# Patient Record
Sex: MALE | Race: WHITE | Employment: OTHER | URBAN - METROPOLITAN AREA
[De-identification: names, ages, dates, MRNs, and addresses within clinical notes are randomized per-mention and may not be internally consistent; named-entity substitution may affect disease eponyms.]

---

## 2017-10-09 ENCOUNTER — DOCUMENTATION ONLY (OUTPATIENT)
Dept: SLEEP MEDICINE | Facility: CLINIC | Age: 72
End: 2017-10-09

## 2017-10-09 RX ORDER — TAMSULOSIN HYDROCHLORIDE 0.4 MG/1
0.4 CAPSULE ORAL AT BEDTIME
COMMUNITY

## 2017-10-09 RX ORDER — COLCHICINE 0.6 MG/1
0.6 TABLET ORAL EVERY OTHER DAY
COMMUNITY
End: 2024-08-19

## 2017-10-09 RX ORDER — BUMETANIDE 1 MG/1
1 TABLET ORAL DAILY
COMMUNITY
End: 2024-08-19

## 2017-10-09 RX ORDER — CHLORAL HYDRATE 500 MG
1 CAPSULE ORAL DAILY
COMMUNITY
End: 2024-08-19

## 2017-10-09 RX ORDER — WARFARIN SODIUM 5 MG/1
5 TABLET ORAL DAILY
COMMUNITY
End: 2024-08-19

## 2017-10-09 RX ORDER — ALBUTEROL SULFATE 90 UG/1
2 AEROSOL, METERED RESPIRATORY (INHALATION) 4 TIMES DAILY PRN
COMMUNITY

## 2017-10-09 RX ORDER — ALLOPURINOL 100 MG/1
200 TABLET ORAL DAILY
COMMUNITY

## 2017-10-09 NOTE — PROGRESS NOTES
Updated medication and allergy list per [facility] chart notes.     OCTAVIO Constantino  Sleep Clinic-Specialist,    Registered Medical Assistant   [facility]

## 2017-10-10 ENCOUNTER — TELEPHONE (OUTPATIENT)
Dept: SLEEP MEDICINE | Facility: CLINIC | Age: 72
End: 2017-10-10

## 2017-10-10 NOTE — TELEPHONE ENCOUNTER
Scheduling letter sent/faxed to [facility] to confirm that pt has been scheduled for sleep consultation. [facility] for [facility] and plan of care.   Ashley Foss,

## 2017-10-10 NOTE — LETTER
October 10, 2017    Dear Beau Wen Khalil, Monse Rossi, and Mell Vallecillo:  Your patient: Aneudy Bergeron  : [birth year]  Last four SSN: [id number]  Is scheduled for a sleep consultation at [facility]   Sleep Consultation Date:  @ 9:30 AM  Please make sure that the patient brings a sleep aid if you think that they may need one for the study.  Final results will be faxed to: [phone]  Patient will also have a copy of the final results mailed to them.  Final results will not be discussed with patient if no follow up in our center is requested.  Patient is instructed to follow up with you for final result and further recommendations/orders, etc.  Please contact our  staff directly with questions/concerns.  Phone [phone], Fax [phone]  Thank you for the referral.  Sincerely,   Sherlyn Anaya MD  Medical Director [facility]  Geoff Salazar MD  Medical Director [facility]

## 2017-10-12 ENCOUNTER — OFFICE VISIT (OUTPATIENT)
Dept: SLEEP MEDICINE | Facility: CLINIC | Age: 72
End: 2017-10-12
Attending: INTERNAL MEDICINE
Payer: COMMERCIAL

## 2017-10-12 VITALS
HEART RATE: 60 BPM | BODY MASS INDEX: 37.51 KG/M2 | HEIGHT: 73 IN | RESPIRATION RATE: 18 BRPM | WEIGHT: 283 LBS | SYSTOLIC BLOOD PRESSURE: 116 MMHG | DIASTOLIC BLOOD PRESSURE: 68 MMHG | OXYGEN SATURATION: 95 %

## 2017-10-12 DIAGNOSIS — G47.33 OBSTRUCTIVE SLEEP APNEA: Primary | ICD-10-CM

## 2017-10-12 PROBLEM — I10 ESSENTIAL HYPERTENSION: Status: ACTIVE | Noted: 2017-10-12

## 2017-10-12 PROBLEM — G89.4 CHRONIC PAIN SYNDROME: Status: ACTIVE | Noted: 2017-10-12

## 2017-10-12 PROBLEM — I50.22 CHRONIC SYSTOLIC CONGESTIVE HEART FAILURE (H): Status: ACTIVE | Noted: 2017-10-12

## 2017-10-12 PROCEDURE — 99211 OFF/OP EST MAY X REQ PHY/QHP: CPT | Mod: ZF

## 2017-10-12 PROCEDURE — 99213 OFFICE O/P EST LOW 20 MIN: CPT

## 2017-10-12 RX ORDER — SPIRONOLACTONE 25 MG
12.5 TABLET ORAL DAILY
COMMUNITY
End: 2024-08-19

## 2017-10-12 NOTE — NURSING NOTE
"Chief Complaint   Patient presents with     Consult       Initial /68  Pulse 60  Resp 18  Ht 1.854 m (6' 1\")  Wt 128.4 kg (283 lb)  SpO2 95%  BMI 37.34 kg/m2 Estimated body mass index is 37.34 kg/(m^2) as calculated from the following:    Height as of this encounter: 1.854 m (6' 1\").    Weight as of this encounter: 128.4 kg (283 lb).  Medication Reconciliation: complete   Estella Manzanares CMA       "

## 2017-10-12 NOTE — PROGRESS NOTES
DATE OF SERVICE:  10/12/2017      Mr. Aneudy Bergeron is a 72-year-old male with significant medical comorbidities who was referred for evaluation of possible sleep-related breathing disorder in the setting of symptoms suggestive of obstructive sleep apnea as well as increased risk for central sleep apnea.   1.  Complex sleep apnea risk.  This gentleman has a long history of loud snoring throughout most of his marriage with no clear worsening of his condition, although he has been developing increasing daytime sleepiness with gradual weight gain to his current maximal BMI of 37.  He also has no known cardiomyopathy [ejection fraction originally 25-30% and more recently 40-45%] with ischemic etiology associated coronary artery disease and history of atrial fibrillation with a cardiac defibrillator in place.  In addition, he has chronic pain syndrome with regularly scheduled morphine for chronic neuropathic pain in doses that are associated with central sleep apnea.  Both his heart failure and morphine dosing increase the risk of nocturnal hypoventilation, hypoxemia and central sleep apnea.      It should be noted this gentleman's congestive heart failure is symptomatically improved with the use of diuretics.  Currently he has had less peripheral edema and exertional dyspnea, although he still experiences dyspnea climbing stairs and sleeps with his bed at a 30-degree angle because of some improvement in breathing.      PAST MEDICAL HISTORY:   1.  History of chronic neuropathy as well as symptoms of restless legs syndrome with sleep disruption, chronic pain and tingling and numbness in his lower legs managed with gabapentin 600 mg 3 times daily and 900 mg at night as well as morphine 15 mg every 8 hours.   2.  History of kidney stones.   3.  Obesity.   4.  History of chronic tobacco use.   5.  History of hypertension, currently on metoprolol and lisinopril.   6.  History of gout, currently on colchicine during symptomatic  periods.      SLEEP HABITS:  This patient has evidence for inappropriate napping during the day during quiet activities, although his [place] Sleepiness Scale is only 4 and he does not have drowsy driving.  He has a fairly regular bedtime at 10:00 p.m. and awakening at 5:00 a.m., though notes that 3 nights out of the week he often has prominent difficulty falling asleep with urge to move and rub his legs due to a painful sensation and numbness in his legs.  He often has to stand and stretch or walk to the bathroom at night.  He describes an abnormal sensation in his legs and twitching of his legs during the night.      This patient because of pain and difficulty falling asleep does not regularly use a sleep promoting agent but does have the television on throughout the night due to frustration with being awake from his primarily sensory symptoms in his legs.      PAST MEDICAL SURGERIES:   1.  History of cervical myelopathy with 3 separate fusions.   2.  Previous coronary angiography.   3.  Ocular injections for macular degeneration.   4.  History of dual-chamber pacemaker and defibrillator.      REVIEW OF SYSTEMS:  Ten-point review of systems is positive for multiple complaints including occasional chest pain, irregular heart rhythm, dyspnea when lying flat, chronic edema of his lower extremities, extensive pain, numb and tingling in his legs as noted above, chronic constipation, dyspnea with exertion and chronic joint pain.      PHYSICAL EXAMINATION:   GENERAL:  Demonstrates a chronically ill-appearing gentleman with normal respiratory pattern at rest, who has resting oxygen saturation of 95%, blood pressure 116/68, neck circumference 50 cm, Mallampati IV.     HEENT:  Oral exam with upper and lower denture plates.   LUNGS:  Clear lungs fields bilaterally.   HEART:  Irregular, no murmurs noted.  Distant heart tones.   EXTREMITIES:  Chronic stasis changes in both lower extremities with trace peripheral edema.    ABDOMEN:  Mild central obesity.      ASSESSMENT:   1.  High probability for complex sleep apnea with features of obstructive sleep apnea as well as central pauses due to either morphine or underlying cardiomyopathy with Cheyne-Cabral.  It is certainly possible in this setting that he has sustained or episodic hypoxemia.   2.  Chronic neuropathy with rather severe restless legs syndrome with associated pain syndrome with probable sleep disruption and irregular sleep pattern.  Although the patient is napping during the day he does not have symptoms of drowsy driving long distances.      PLAN:   Sleep studies for evaluation of nocturnal hypoxemia, obstructive sleep apnea and central sleep apnea.  In the event of obstructive sleep apnea, CPAP will be titrated to eliminate obstructive events with the addition of oxygen if he has persistent sustained or episodic hypoxemia with a total duration of greater than 5 minutes.  CO2 monitoring will be performed, although we are not requesting arterial blood gases because of his chronic anticoagulation.     2.  We will not be recommending the use of adaptive servo ventilation given his severely reduced ejection fraction.  The patient will be studied in the semi-reclining position, a 30-degree angle which is what he uses at home.  Monitoring will be performed for sleep disruption and restless legs syndrome.  The patient did refuse sleep promoting agent in this setting for fear that it may interfere with his other medications.      This patient would be interested in trying the use of CPAP for long-term management with additional oxygen if necessary, which may be preferable treatment for complex sleep apnea in this setting.  Adaptive servo ventilation carries an at least described increased risk of mortality in this setting and will not be employed.        3.  Ferritin should be measured if [facility] not [facility] to exclude low iron as a contributing factor  for his restless legs syndrome, although seems much more likely that his symptoms are related to underlying neuropathic process.  Increase gabapentin dosing at night might be considered, although the dosing to gabapentin will have to be carefully gauged given underlying renal insufficiency.      Total time spent with patient was 45 minutes, greater than 50% counseling.         FIDENCIO BRYAN MD             D: 10/12/2017 11:51   T: 10/12/2017 12:44   MT:       Name:     TOR HOGAN   MRN:      [id number]        Account:      [id number]   :      1945           Visit Date:   10/12/2017      Document: [id number]       cc: Charlene Mott MD

## 2017-10-12 NOTE — MR AVS SNAPSHOT
"              After Visit Summary   10/12/2017    Aneudy Bergeron    MRN: [id number]           Patient Information     Date Of Birth          1945        Visit Information        Provider Department      10/12/2017 10:00 AM Geoff Salazar MD [facility], [place], Sleep Study        Today's Diagnoses     Obstructive sleep apnea    -  1      Care Instructions    MY TREATMENT INFORMATION FOR SLEEP APNEA-  Aneudy Bergeron    DOCTOR : GEOFF SALAZAR  SLEEP CENTER :      MY CONTACT NUMBER:     Am I having a sleep study at a sleep center?  Make sure you have an appointment for the study before you leave!    Am I having a home sleep study?  Watch this video:  [email or web address]/watch?v=CteI_[email or web address]&[email or web address]    Frequently asked questions:  1. What is Obstructive Sleep Apnea (ARISTEO)? ARISTEO is the most common type of sleep apnea. Apnea means, \"without breath.\"  Apnea is most often caused by narrowing or collapse of the upper airway as muscles relax during sleep.   Almost everyone has occasional apneas. Most people with sleep apnea have had brief interruptions at night frequently for many years.  The severity of sleep apnea is related to how frequent and severe the events are.   2. What are the consequences of ARISTEO? Symptoms include: feeling sleepy during the day, snoring loudly, gasping or stopping of breathing, trouble sleeping, and occasionally morning headaches or heartburn at night.  Sleepiness can be serious and even increase the risk of falling asleep while driving. Other health consequences may include development of high blood pressure and other cardiovascular disease in persons who are susceptible. Untreated ARISTEO  can contribute to heart disease, stroke and diabetes.   3. What are the treatment options? In most situations, sleep apnea is a lifelong disease that must be managed with daily therapy. Medications are not effective for sleep apnea and surgery is generally not considered until other " therapies have been tried. Your treatment is your choice . Continuous Positive Airway (CPAP) works right away and is the therapy that is effective in nearly everyone. An oral device to hold your jaw forward is usually the next most reliable option. Other options include postioning devices (to keep you off your back), weight loss, and surgery including a tongue pacing device. There is more detail about some of these options below.    Important tips for using CPAP and similar devices   Know your equipment:  CPAP is continuous positive airway pressure that prevents obstructive sleep apnea by keeping the throat from collapsing while you are sleeping. In most cases, the device is  smart  and can slowly self-adjusts if your throat collapses and keeps a record every day of how well you are treated-this information is available to you and your care team.  BPAP is bilevel positive airway pressure that keeps your throat open and also assists each breath with a pressure boost to maintain adequate breathing.  Special kinds of BPAP are used in patients who have inadequate breathing from lung or heart disease. In most cases, the device is  smart  and can slowly self-adjusts to assist breathing. Like CPAP, the device keeps a record of how well you are treated.  Your mask is your connection to the device. You get to choose what feels most comfortable and the staff will help to make sure if fits. Here: are some examples of the different masks that are available:       Key points to remember on your journey with sleep apnea:  1. Sleep study.  PAP devices often need to be adjusted during a sleep study to show that they are effective and adjusted right.  2. Good tips to remember: Try wearing just the mask during a quiet time during the day so your body adapts to wearing it. A humidifier is recommended for comfort in most cases to prevent drying of your nose and throat. Allergy medication from your provider may help you if you are  having nasal congestion.  3. Getting settled-in. It takes more than one night for most of us to get used to wearing a mask. Try wearing just the mask during a quiet time during the day so your body adapts to wearing it. A humidifier is recommended for comfort in most cases. Our team will work with you carefully on the first day and will be in contact within 4 days and again at 2 and 4 weeks for advice and remote device adjustments. Your therapy is evaluated by the device each day.   4. Use it every night. The more you are able to sleep naturally for 7-8 hours, the more likely you will have good sleep and to prevent health risks or symptoms from sleep apnea. Even if you use it 4 hours it helps. Occasionally all of us are unable to use a medical therapy, in sleep apnea, it is not dangerous to miss one night.   5. Communicate. Call our skilled team on the number provided on the first day if your visit for problems that make it difficult to wear the device. Over 2 out of 3 patients can learn to wear the device long-term with help from our team. Remember to call our team or your sleep providers if you are unable to wear the device as we may have other solutions for those who cannot adapt to mask CPAP therapy. It is recommended that you sleep your sleep provider within the first 3 months and yearly after that if you are not having problems.   Take care of your equipment. Make sure you clean your mask and tubing using directions every day and that your filter and mask are replaced as recommended or if they are not working.     BESIDES CPAP, WHAT OTHER THERAPIES ARE THERE?    Positioning Device  Positioning devices are generally used when sleep apnea is mild and only occurs on your back.This example shows a pillow that straps around the waist. It may be appropriate for those whose sleep study shows milder sleep apnea that occurs primarily when lying flat on one's back. Preliminary studies have shown benefit but  effectiveness at home may need to be verified by a home sleep test. These devices are generally not covered by medical insurance.        Oral Appliance  What is oral appliance therapy?  An oral appliance device fits on your teeth at night like a retainer used after having braces. The device is made by a specialized dentist and requires several visits over 1-2 months before a manufactured device is made to fit your teeth and is adjusted to prevent your sleep apnea. Once an oral device is working properly, snoring should be improved. A home sleep test may be recommended at that time if to determine whether the sleep apnea is adequately treated.       Some things to remember:  -Oral devices are often, but not always, covered by your medical insurance. Be sure to check with your insurance provider.   -If you are referred for oral therapy, you will be given a list of specialized dentists to consider or you may choose to visit the Web site of the American Academy of Dental Sleep Medicine  -Oral devices are less likely to work if you have severe sleep apnea or are extremely overweight.     More detailed information  An oral appliance is a small acrylic device that fits over the upper and lower teeth  (similar to a retainer or a mouth guard). This device slightly moves jaw forward, which moves the base of the tongue forward, opens the airway, improves breathing for effective treat snoring and obstructive sleep apnea in perhaps 7 out of 10 people .  The best working devices are custom-made by a dental device  after a mold is made of the teeth 1, 2, 3.  When is an oral appliance indicated?  Oral appliance therapy is recommended as a first-line treatment for patients with primary snoring, mild sleep apnea, and for patients with moderate sleep apnea who prefer appliance therapy to use of CPAP4, 5. Severity of sleep apnea is determined by sleep testing and is based on the number of respiratory events per hour of  sleep.   How successful is oral appliance therapy?  The success rate of oral appliance therapy in patients with mild sleep apnea is 75-80% while in patients with moderate sleep apnea it is 50-70%. The chance of success in patients with severe sleep apnea is 40-50%. The research also shows that oral appliances have a beneficial effect on the cardiovascular health of ARISTEO patients at the same magnitude as CPAP therapy7.  Oral appliances should be a second-line treatment in cases of severe sleep apnea, but if not completely successful then a combination therapy utilizing CPAP plus oral appliance therapy may be effective. Oral appliances tend to be effective in a broad range of patients although studies show that the patients who have the highest success are females, younger patients, those with milder disease, and less severe obesity. 3, 6.   Finding a dentist that practices dental sleep medicine  Specific training is available through the American Academy of Dental Sleep Medicine for dentists interested in working in the field of sleep. To find a dentist who is educated in the field of sleep and the use of oral appliances, near you, visit the Web site of the American Academy of Dental Sleep Medicine.    References  1. Fausto et al. Objectively measured vs self-reported compliance during oral appliance therapy for sleep-disordered breathing. Chest 2013; 144(5): 2934-3723.  2. Carmen et al. Objective measurement of compliance during oral appliance therapy for sleep-disordered breathing. Thorax 2013; 68(1): 91-96.  3. Topher, et al. Mandibular advancement devices in 620 men and women with ARISTEO and snoring: tolerability and predictors of treatment success. Chest 2004; 125: 7893-1349.  4. Juan, et al. Oral appliances for snoring and ARISTEO: a review. Sleep 2006; 29: 244-262.  5. Naheed, et al. Oral appliance treatment for ARISTEO: an update. J Clin Sleep Med 2014; 10(2): 215-227.  6. Dandy et al. Predictors  of OSAH treatment outcome. J Dent Res 2007; 86: 0424-3344.      Weight Loss:    Weight loss is a long-term strategy that may improve sleep apnea in some patients.    Weight management is a personal decision.  If you are interested in exploring weight loss strategies, the following discussion covers the impact on weight loss on sleep apnea and the approaches that may be successful.    Weight loss decreases severity of sleep apnea in most people with obesity. For those with mild obesity who have developed snoring with weight gain, even 15-30 pound weight loss can improve and occasionally eliminate sleep apnea.  Structured and life-long dietary and health habits are necessary to lose weight and keep healthier weight levels.     Though there may be significant health benefits from weight loss, long-term weight loss is very difficult to achieve- studies show success with dietary management in less than 10% of people. In addition, substantial weight loss may require years of dietary control and may be difficult if patients have severe obesity. In these cases, surgical management may be considered.  Finally, older individuals who have tolerated obesity without health complications may be less likely to benefit from weight loss strategies.        Your BMI is Body mass index is 37.34 kg/(m^2).  Weight management is a personal decision.  If you are interested in exploring weight loss strategies, the following discussion covers the approaches that may be successful. Body mass index (BMI) is one way to tell whether you are at a healthy weight, overweight, or obese. It measures your weight in relation to your height.  A BMI of 18.5 to 24.9 is in the healthy range. A person with a BMI of 25 to 29.9 is considered overweight, and someone with a BMI of 30 or greater is considered obese. More than two-thirds of American adults are considered overweight or obese.  Being overweight or obese increases the risk for further weight gain.  Excess weight may lead to heart disease and diabetes.  Creating and following plans for healthy eating and physical activity may help you improve your health.  Weight control is part of healthy lifestyle and includes exercise, emotional health, and healthy eating habits. Careful eating habits lifelong are the mainstay of weight control. Though there are significant health benefits from weight loss, long-term weight loss with diet alone may be very difficult to achieve- studies show long-term success with dietary management in less than 10% of people. Attaining a healthy weight may be especially difficult to achieve in those with severe obesity. In some cases, medications, devices and surgical management might be considered.  What can you do?  If you are overweight or obese and are interested in methods for weight loss, you should discuss this with your provider.     Consider reducing daily calorie intake by 500 calories.     Keep a food journal.     Avoiding skipping meals, consider cutting portions instead.    Diet combined with exercise helps maintain muscle while optimizing fat loss. Strength training is particularly important for building and maintaining muscle mass. Exercise helps reduce stress, increase energy, and improves fitness. Increasing exercise without diet control, however, may not burn enough calories to loose weight.       Start walking three days a week 10-20 minutes at a time    Work towards walking thirty minutes five days a week     Eventually, increase the speed of your walking for 1-2 minutes at time    In addition, we recommend that you review healthy lifestyles and methods for weight loss available through the National Institutes of Health patient information sites:  http://win.niddk.nih.gov/publications/index.htm    And look into health and wellness programs that may be available through your health insurance provider, employer, local community center, or nazanin club.    Weight management  plan: Patient was referred to their PCP to discuss a diet and exercise plan.      Surgery:    Surgery for obstructive sleep apnea is considered generally only when other therapies fail to work. Surgery may be discussed with you if you are having a difficult time tolerating CPAP and or when there is an abnormal structure that requires surgical correction.  Nose and throat surgeries often enlarge the airway to prevent collapse.  Most of these surgeries create pain for 1-2 weeks and up to half of the most common surgeries are not effective throughout life.  You should carefully discuss the benefits and drawbacks to surgery with your sleep provider and surgeon to determine if it is the best solution for you.   More information  Surgery for ARISTEO is directed at areas that are responsible for narrowing or complete obstruction of the airway during sleep.  There are a wide range of procedures available to enlarge and/or stabilize the airway to prevent blockage of breathing in the three major areas where it can occur: the palate, tongue, and nasal regions.  Successful surgical treatment depends on the accurate identification of the factors responsible for obstructive sleep apnea in each person.  A personalized approach is required because there is no single treatment that works well for everyone.  Because of anatomic variation, consultation with an examination by a sleep surgeon is a critical first step in determining what surgical options are best for each patient.  In some cases, examination during sedation may be recommended in order to guide the selection of procedures.  Patients will be counseled about risks and benefits as well as the typical recovery course after surgery. Surgery is typically not a cure for a person s ARISTEO.  However, surgery will often significantly improve one s ARISTEO severity (termed  success rate ).  Even in the absence of a cure, surgery will decrease the cardiovascular risk associated with OSA7;  improve overall quality of life8 (sleepiness, functionality, sleep quality, etc).      Palate Procedures:  Patients with ARISTEO often have narrowing of their airway in the region of their tonsils and uvula.  The goals of palate procedures are to widen the airway in this region as well as to help the tissues resist collapse.  Modern palate procedure techniques focus on tissue conservation and soft tissue rearrangement, rather than tissue removal.  Often the uvula is preserved in this procedure. Residual sleep apnea is common in patient after pharyngoplasty with an average reduction in sleep apnea events of 33%2.      Tongue Procedures:  ExamWhile patients are awake, the muscles that surround the throat are active and keep this region open for breathing. These muscles relax during sleep, allowing the tongue and other structures to collapse and block breathing.  There are several different tongue procedures available.  Selection of a tongue base procedure depends on characteristics seen on physical exam.  Generally, procedures are aimed at removing bulky tissues in this area or preventing the back of the tongue from falling back during sleep.  Success rates for tongue surgery range from 50-62%3.    Hypoglossal Nerve Stimulation:  Hypoglossal nerve stimulation has recently received approval from the [place] Food and Drug Administration for the treatment of obstructive sleep apnea.  This is based on research showing that the system was safe and effective in treating sleep apnea6.  Results showed that the median AHI score decreased 68%, from 29.3 to 9.0. This therapy uses an implant system that senses breathing patterns and delivers mild stimulation to airway muscles, which keeps the airway open during sleep.  The system consists of three fully implanted components: a small generator (similar in size to a pacemaker), a breathing sensor, and a stimulation lead.  Using a small handheld remote, a patient turns the therapy  on before bed and off upon awakening.    Candidates for this device must be greater than 22 years of age, have moderate to severe ARISTEO (AHI between 20-65), BMI less than 32, have tried CPAP/oral appliance without success, and have appropriate upper airway anatomy (determined by a sleep endoscopy performed by Dr. Sifuentes).    Hypoglossal Nerve Stimulation Pathway:    The sleep surgeon s office will work with the patient through the insurance prior-authorization process (including communications and appeals).    Nasal Procedures:  Nasal obstruction can interfere with nasal breathing during the day and night.  Studies have shown that relief of nasal obstruction can improve the ability of some patients to tolerate positive airway pressure therapy for obstructive sleep apnea1.  Treatment options include medications such as nasal saline, topical corticosteroid and antihistamine sprays, and oral medications such as antihistamines or decongestants. Non-surgical treatments can include external nasal dilators for selected patients. If these are not successful by themselves, surgery can improve the nasal airway either alone or in combination with these other options.      Combination Procedures:  Combination of surgical procedures and other treatments may be recommended, particularly if patients have more than one area of narrowing or persistent positional disease.  The success rate of combination surgery ranges from 66-80%2,3.    References  1. Sae LONG. The Role of the Nose in Snoring and Obstructive Sleep Apnoea: An Update.  Eur Arch Otorhinolaryngol. 2011; 268: 1365-73.  2.  Manjit SM; Sorin JA; Tammi JR; Pallanch JF; Mónica MB; Chrissy SG; Lexi BANDA. Surgical modifications of the upper airway for obstructive sleep apnea in adults: a systematic review and meta-analysis. SLEEP 2010;33(10):4178-3970. Rodríguez JJ. Hypopharyngeal surgery in obstructive sleep apnea: an evidence-based medicine review.  Arch Otolaryngol Head Neck  Surg. 2006 Feb;132(2):206-13.  3. Sarkis YH1, Cristela Y, Paul ELIOT. The efficacy of anatomically based multilevel surgery for obstructive sleep apnea. Otolaryngol Head Neck Surg. 2003 Oct;129(4):327-35.  4. Kezirian E, Goldberg A. Hypopharyngeal Surgery in Obstructive Sleep Apnea: An Evidence-Based Medicine Review. Arch Otolaryngol Head Neck Surg. 2006 Feb;132(2):206-13.  5. Bambi RECINOS et al. Upper-Airway Stimulation for Obstructive Sleep Apnea.  N Engl J Med. 2014 Jan 9;370(2):139-49.  6. Taniya Y et al. Increased Incidence of Cardiovascular Disease in Middle-aged Men with Obstructive Sleep Apnea. Am J Respir Crit Care Med; 2002 166: 159-165  7. Michael HERNANDEZ et al. Studying Life Effects and Effectiveness of Palatopharyngoplasty (SLEEP) study: Subjective Outcomes of Isolated Uvulopalatopharyngoplasty. Otolaryngol Head Neck Surg. 2011; 144: 623-631.            Your BMI is Body mass index is 37.34 kg/(m^2).  Weight management is a personal decision.  If you are interested in exploring weight loss strategies, the following discussion covers the approaches that may be successful. Body mass index (BMI) is one way to tell whether you are at a healthy weight, overweight, or obese. It measures your weight in relation to your height.  A BMI of 18.5 to 24.9 is in the healthy range. A person with a BMI of 25 to 29.9 is considered overweight, and someone with a BMI of 30 or greater is considered obese. More than two-thirds of American adults are considered overweight or obese.  Being overweight or obese increases the risk for further weight gain. Excess weight may lead to heart disease and diabetes.  Creating and following plans for healthy eating and physical activity may help you improve your health.  Weight control is part of healthy lifestyle and includes exercise, emotional health, and healthy eating habits. Careful eating habits lifelong are the mainstay of weight control. Though there are significant health benefits from weight  loss, long-term weight loss with diet alone may be very difficult to achieve- studies show long-term success with dietary management in less than 10% of people. Attaining a healthy weight may be especially difficult to achieve in those with severe obesity. In some cases, medications, devices and surgical management might be considered.  What can you do?  If you are overweight or obese and are interested in methods for weight loss, you should discuss this with your provider.     Consider reducing daily calorie intake by 500 calories.     Keep a food journal.     Avoiding skipping meals, consider cutting portions instead.    Diet combined with exercise helps maintain muscle while optimizing fat loss. Strength training is particularly important for building and maintaining muscle mass. Exercise helps reduce stress, increase energy, and improves fitness. Increasing exercise without diet control, however, may not burn enough calories to loose weight.       Start walking three days a week 10-20 minutes at a time    Work towards walking thirty minutes five days a week     Eventually, increase the speed of your walking for 1-2 minutes at time    In addition, we recommend that you review healthy lifestyles and methods for weight loss available through the National Institutes of Health patient information sites:  http://win.niddk.nih.gov/publications/index.htm    And look into health and wellness programs that may be available through your health insurance provider, employer, local community center, or nazanin club.    Weight management plan: Patient was referred to their PCP to discuss a diet and exercise plan.              Follow-ups after your visit        Follow-up notes from your care team     Return after sleep study.      Your next 10 appointments already scheduled     Nov 15, 2017  8:00 PM CST   PSG Split with SLEEP STUDY RM 6   [facility], [place], Sleep Study ([facility])     "[address] [place] [id number]   [phone]              Future tests that were ordered for you today     Open Future Orders        Priority Expected Expires Ordered    Comprehensive Sleep Study Routine  4/10/2018 10/12/2017            Who to contact     If you have questions or need follow up information about today's clinic visit or your schedule please [facility] directly at [phone].  Normal or non-critical lab and imaging results will be communicated to you by [facility] [employer], letter or phone within 4 business days after the clinic has received the results. If you do not hear from us within 7 days, please contact the clinic through [employer] or phone. If you have a critical or abnormal lab result, we will notify you by phone as soon as possible.  Submit refill requests through [employer] or call your pharmacy and they will forward the refill request to us. Please allow 3 business days for your refill to be completed.          Additional Information About Your Visit        Cellity Information     Cellity lets you send messages to your doctor, view your test results, renew your prescriptions, schedule appointments and more. To sign up, go to [email or web address] . Click on \"Log in\" on the left side of the screen, which will take you to the Welcome page. Then click on \"Sign up Now\" on the right side of the page.     You will be asked to enter the access code listed below, as well as some personal information. Please follow the directions to create your username and password.     Your access code is: [email or web address]  Expires: 1/10/2018 11:09 AM     Your access code will  in 90 days. If you need help or a new code, please call your [place] clinic or [phone].        Care EveryWhere ID     This is your Care EveryWhere ID. This could be used by other organizations to access your [place] medical records  [id number]        Your Vitals Were     Pulse Respirations Height Pulse " "Oximetry BMI (Body Mass Index)       60 18 1.854 m (6' 1\") 95% 37.34 kg/m2        Blood Pressure from Last 3 Encounters:   10/12/17 116/68    Weight from Last 3 Encounters:   10/12/17 128.4 kg (283 lb)               Primary Care Provider    None Specified       No primary provider on file.        Equal Access to Services     [facility] : Hadii aad ku hadwagnero Soomaali, waaxda luqadaha, qaybta kaalmada adenachoyada, beverley zapata adrianan adenacho felixcecil bernalKaelaletician . So [facility] [phone].    ATENCIÓN: Si habla español, tiene a tran disposición servicios gratuitos de asistencia lingüística. Llame al [phone].    We comply with applicable federal civil rights laws and [place] laws. We do not discriminate on the basis of race, color, national origin, age, disability, sex, sexual orientation, or gender identity.            Thank you!     Thank you for choosing [facility], [facility]  for your care. Our goal is always to provide you with excellent care. Hearing back from our patients is one way we can continue to improve our services. Please take a few minutes to complete the written survey that you may receive in the mail after your visit with us. Thank you!             Your Updated Medication List - Protect others around you: Learn how to safely use, store and throw away your medicines at www.disposemymeds.org.          This list is accurate as of: 10/12/17 11:17 AM.  Always use your most recent med list.                   Brand Name Dispense Instructions for use Diagnosis    albuterol 108 (90 BASE) MCG/ACT Inhaler    PROAIR HFA/PROVENTIL HFA/VENTOLIN HFA     Inhale 2 puffs into the lungs 4 times daily        allopurinol 100 MG tablet    ZYLOPRIM     Take 200 mg by mouth daily        aspirin 81 MG tablet      Take 1 tablet by mouth daily        bumetanide 1 MG tablet    BUMEX     Take 1 mg by mouth daily        colchicine 0.6 MG tablet      Take 0.6 mg by mouth every other day        cyanocobalamin 1000 MCG Tabs      Take " 1 tablet by mouth daily        docusate sodium 50 MG capsule    COLACE     Take 50 mg by mouth 2 times daily        fish oil-omega-3 fatty acids 1000 MG capsule      Take 1 g by mouth daily        gabapentin 400 MG capsule    NEURONTIN     Take 300 mg by mouth 3 times daily Take 2 tablets every morning, at noon and every evening and take 3 tablets at bedtime.        lisinopril 20 MG tablet    PRINIVIL/ZESTRIL     Take 10 mg by mouth daily        metoprolol 25 MG 24 hr tablet    TOPROL-XL     Take 0.5 tablets by mouth At Bedtime        morphine 15 MG IR tablet    MSIR     Take 15 mg by mouth every 8 hours as needed        multivitamin  with lutein Caps per capsule      Take 1 capsule by mouth 2 times daily        naloxone nasal spray    NARCAN     Spray 4 mg into one nostril alternating nostrils as needed for opioid reversal every 2-3 minutes until assistance arrives        simvastatin 20 MG tablet    ZOCOR     Take 1 tablet by mouth At Bedtime        spironolactone 12.5 mg Tabs half-tab    ALDACTONE     Take 12.5 mg by mouth daily        tamsulosin 0.4 MG capsule    FLOMAX     Take 0.4 mg by mouth At Bedtime        warfarin 5 MG tablet    COUMADIN     Take 5 mg by mouth daily Take half tablet on Mondays.

## 2017-10-12 NOTE — PATIENT INSTRUCTIONS
"MY TREATMENT INFORMATION FOR SLEEP APNEA-  Aneudy Bergeron    DOCTOR : FIDENCIO BRYAN  SLEEP CENTER :      MY CONTACT NUMBER:     Am I having a sleep study at a sleep center?  Make sure you have an appointment for the study before you leave!    Am I having a home sleep study?  Watch this video:  [email or web address] [email or web address]/watch?v=CteI_[email or web address]&[email or web address]    Frequently asked questions:  1. What is Obstructive Sleep Apnea (ARISTEO)? ARISTEO is the most common type of sleep apnea. Apnea means, \"without breath.\"  Apnea is most often caused by narrowing or collapse of the upper airway as muscles relax during sleep.   Almost everyone has occasional apneas. Most people with sleep apnea have had brief interruptions at night frequently for many years.  The severity of sleep apnea is related to how frequent and severe the events are.   2. What are the consequences of ARISTEO? Symptoms include: feeling sleepy during the day, snoring loudly, gasping or stopping of breathing, trouble sleeping, and occasionally morning headaches or heartburn at night.  Sleepiness can be serious and even increase the risk of falling asleep while driving. Other health consequences may include development of high blood pressure and other cardiovascular disease in persons who are susceptible. Untreated ARISTEO  can contribute to heart disease, stroke and diabetes.   3. What are the treatment options? In most situations, sleep apnea is a lifelong disease that must be managed with daily therapy. Medications are not effective for sleep apnea and surgery is generally not considered until other therapies have been tried. Your treatment is your choice . Continuous Positive Airway (CPAP) works right away and is the therapy that is effective in nearly everyone. An oral device to hold your jaw forward is usually the next most reliable option. Other options include postioning devices (to keep you off your back), weight loss, and surgery including a tongue " pacing device. There is more detail about some of these options below.    Important tips for using CPAP and similar devices   Know your equipment:  CPAP is continuous positive airway pressure that prevents obstructive sleep apnea by keeping the throat from collapsing while you are sleeping. In most cases, the device is  smart  and can slowly self-adjusts if your throat collapses and keeps a record every day of how well you are treated-this information is available to you and your care team.  BPAP is bilevel positive airway pressure that keeps your throat open and also assists each breath with a pressure boost to maintain adequate breathing.  Special kinds of BPAP are used in patients who have inadequate breathing from lung or heart disease. In most cases, the device is  smart  and can slowly self-adjusts to assist breathing. Like CPAP, the device keeps a record of how well you are treated.  Your mask is your connection to the device. You get to choose what feels most comfortable and the staff will help to make sure if fits. Here: are some examples of the different masks that are available:       Key points to remember on your journey with sleep apnea:  1. Sleep study.  PAP devices often need to be adjusted during a sleep study to show that they are effective and adjusted right.  2. Good tips to remember: Try wearing just the mask during a quiet time during the day so your body adapts to wearing it. A humidifier is recommended for comfort in most cases to prevent drying of your nose and throat. Allergy medication from your provider may help you if you are having nasal congestion.  3. Getting settled-in. It takes more than one night for most of us to get used to wearing a mask. Try wearing just the mask during a quiet time during the day so your body adapts to wearing it. A humidifier is recommended for comfort in most cases. Our team will work with you carefully on the first day and will be in contact within 4 days  and again at 2 and 4 weeks for advice and remote device adjustments. Your therapy is evaluated by the device each day.   4. Use it every night. The more you are able to sleep naturally for 7-8 hours, the more likely you will have good sleep and to prevent health risks or symptoms from sleep apnea. Even if you use it 4 hours it helps. Occasionally all of us are unable to use a medical therapy, in sleep apnea, it is not dangerous to miss one night.   5. Communicate. Call our skilled team on the number provided on the first day if your visit for problems that make it difficult to wear the device. Over 2 out of 3 patients can learn to wear the device long-term with help from our team. Remember to call our team or your sleep providers if you are unable to wear the device as we may have other solutions for those who cannot adapt to mask CPAP therapy. It is recommended that you sleep your sleep provider within the first 3 months and yearly after that if you are not having problems.   Take care of your equipment. Make sure you clean your mask and tubing using directions every day and that your filter and mask are replaced as recommended or if they are not working.     BESIDES CPAP, WHAT OTHER THERAPIES ARE THERE?    Positioning Device  Positioning devices are generally used when sleep apnea is mild and only occurs on your back.This example shows a pillow that straps around the waist. It may be appropriate for those whose sleep study shows milder sleep apnea that occurs primarily when lying flat on one's back. Preliminary studies have shown benefit but effectiveness at home may need to be verified by a home sleep test. These devices are generally not covered by medical insurance.        Oral Appliance  What is oral appliance therapy?  An oral appliance device fits on your teeth at night like a retainer used after having braces. The device is made by a specialized dentist and requires several visits over 1-2 months before a  manufactured device is made to fit your teeth and is adjusted to prevent your sleep apnea. Once an oral device is working properly, snoring should be improved. A home sleep test may be recommended at that time if to determine whether the sleep apnea is adequately treated.       Some things to remember:  -Oral devices are often, but not always, covered by your medical insurance. Be sure to check with your insurance provider.   -If you are referred for oral therapy, you will be given a list of specialized dentists to consider or you may choose to visit the Web site of the American Academy of Dental Sleep Medicine  -Oral devices are less likely to work if you have severe sleep apnea or are extremely overweight.     More detailed information  An oral appliance is a small acrylic device that fits over the upper and lower teeth  (similar to a retainer or a mouth guard). This device slightly moves jaw forward, which moves the base of the tongue forward, opens the airway, improves breathing for effective treat snoring and obstructive sleep apnea in perhaps 7 out of 10 people .  The best working devices are custom-made by a dental device  after a mold is made of the teeth 1, 2, 3.  When is an oral appliance indicated?  Oral appliance therapy is recommended as a first-line treatment for patients with primary snoring, mild sleep apnea, and for patients with moderate sleep apnea who prefer appliance therapy to use of CPAP4, 5. Severity of sleep apnea is determined by sleep testing and is based on the number of respiratory events per hour of sleep.   How successful is oral appliance therapy?  The success rate of oral appliance therapy in patients with mild sleep apnea is 75-80% while in patients with moderate sleep apnea it is 50-70%. The chance of success in patients with severe sleep apnea is 40-50%. The research also shows that oral appliances have a beneficial effect on the cardiovascular health of ARISTEO patients  at the same magnitude as CPAP therapy7.  Oral appliances should be a second-line treatment in cases of severe sleep apnea, but if not completely successful then a combination therapy utilizing CPAP plus oral appliance therapy may be effective. Oral appliances tend to be effective in a broad range of patients although studies show that the patients who have the highest success are females, younger patients, those with milder disease, and less severe obesity. 3, 6.   Finding a dentist that practices dental sleep medicine  Specific training is available through the American Academy of Dental Sleep Medicine for dentists interested in working in the field of sleep. To find a dentist who is educated in the field of sleep and the use of oral appliances, near you, visit the Web site of the American Academy of Dental Sleep Medicine.    References  1. Fausto et al. Objectively measured vs self-reported compliance during oral appliance therapy for sleep-disordered breathing. Chest 2013; 144(5): 1749-4259.  2. Carmen et al. Objective measurement of compliance during oral appliance therapy for sleep-disordered breathing. Thorax 2013; 68(1): 91-96.  3. Topher, et al. Mandibular advancement devices in 620 men and women with ARISTEO and snoring: tolerability and predictors of treatment success. Chest 2004; 125: 9769-7650.  4. Martinez, et al. Oral appliances for snoring and ARISTEO: a review. Sleep 2006; 29: 244-262.  5. Naheed et al. Oral appliance treatment for ARISTEO: an update. J Clin Sleep Med 2014; 10(2): 215-227.  6. Dandy et al. Predictors of OSAH treatment outcome. J Dent Res 2007; 86: 6919-2528.      Weight Loss:    Weight loss is a long-term strategy that may improve sleep apnea in some patients.    Weight management is a personal decision.  If you are interested in exploring weight loss strategies, the following discussion covers the impact on weight loss on sleep apnea and the approaches that may be  successful.    Weight loss decreases severity of sleep apnea in most people with obesity. For those with mild obesity who have developed snoring with weight gain, even 15-30 pound weight loss can improve and occasionally eliminate sleep apnea.  Structured and life-long dietary and health habits are necessary to lose weight and keep healthier weight levels.     Though there may be significant health benefits from weight loss, long-term weight loss is very difficult to achieve- studies show success with dietary management in less than 10% of people. In addition, substantial weight loss may require years of dietary control and may be difficult if patients have severe obesity. In these cases, surgical management may be considered.  Finally, older individuals who have tolerated obesity without health complications may be less likely to benefit from weight loss strategies.        Your BMI is Body mass index is 37.34 kg/(m^2).  Weight management is a personal decision.  If you are interested in exploring weight loss strategies, the following discussion covers the approaches that may be successful. Body mass index (BMI) is one way to tell whether you are at a healthy weight, overweight, or obese. It measures your weight in relation to your height.  A BMI of 18.5 to 24.9 is in the healthy range. A person with a BMI of 25 to 29.9 is considered overweight, and someone with a BMI of 30 or greater is considered obese. More than two-thirds of American adults are considered overweight or obese.  Being overweight or obese increases the risk for further weight gain. Excess weight may lead to heart disease and diabetes.  Creating and following plans for healthy eating and physical activity may help you improve your health.  Weight control is part of healthy lifestyle and includes exercise, emotional health, and healthy eating habits. Careful eating habits lifelong are the mainstay of weight control. Though there are significant  health benefits from weight loss, long-term weight loss with diet alone may be very difficult to achieve- studies show long-term success with dietary management in less than 10% of people. Attaining a healthy weight may be especially difficult to achieve in those with severe obesity. In some cases, medications, devices and surgical management might be considered.  What can you do?  If you are overweight or obese and are interested in methods for weight loss, you should discuss this with your provider.     Consider reducing daily calorie intake by 500 calories.     Keep a food journal.     Avoiding skipping meals, consider cutting portions instead.    Diet combined with exercise helps maintain muscle while optimizing fat loss. Strength training is particularly important for building and maintaining muscle mass. Exercise helps reduce stress, increase energy, and improves fitness. Increasing exercise without diet control, however, may not burn enough calories to loose weight.       Start walking three days a week 10-20 minutes at a time    Work towards walking thirty minutes five days a week     Eventually, increase the speed of your walking for 1-2 minutes at time    In addition, we recommend that you review healthy lifestyles and methods for weight loss available through the National Institutes of Health patient information sites:  http://win.niddk.nih.gov/publications/index.htm    And look into health and wellness programs that may be available through your health insurance provider, employer, local community center, or nazanin club.    Weight management plan: Patient was referred to their PCP to discuss a diet and exercise plan.      Surgery:    Surgery for obstructive sleep apnea is considered generally only when other therapies fail to work. Surgery may be discussed with you if you are having a difficult time tolerating CPAP and or when there is an abnormal structure that requires surgical correction.  Nose and  throat surgeries often enlarge the airway to prevent collapse.  Most of these surgeries create pain for 1-2 weeks and up to half of the most common surgeries are not effective throughout life.  You should carefully discuss the benefits and drawbacks to surgery with your sleep provider and surgeon to determine if it is the best solution for you.   More information  Surgery for ARISTEO is directed at areas that are responsible for narrowing or complete obstruction of the airway during sleep.  There are a wide range of procedures available to enlarge and/or stabilize the airway to prevent blockage of breathing in the three major areas where it can occur: the palate, tongue, and nasal regions.  Successful surgical treatment depends on the accurate identification of the factors responsible for obstructive sleep apnea in each person.  A personalized approach is required because there is no single treatment that works well for everyone.  Because of anatomic variation, consultation with an examination by a sleep surgeon is a critical first step in determining what surgical options are best for each patient.  In some cases, examination during sedation may be recommended in order to guide the selection of procedures.  Patients will be counseled about risks and benefits as well as the typical recovery course after surgery. Surgery is typically not a cure for a person s ARISTEO.  However, surgery will often significantly improve one s ARISTEO severity (termed  success rate ).  Even in the absence of a cure, surgery will decrease the cardiovascular risk associated with OSA7; improve overall quality of life8 (sleepiness, functionality, sleep quality, etc).      Palate Procedures:  Patients with ARISTEO often have narrowing of their airway in the region of their tonsils and uvula.  The goals of palate procedures are to widen the airway in this region as well as to help the tissues resist collapse.  Modern palate procedure techniques focus on  tissue conservation and soft tissue rearrangement, rather than tissue removal.  Often the uvula is preserved in this procedure. Residual sleep apnea is common in patient after pharyngoplasty with an average reduction in sleep apnea events of 33%2.      Tongue Procedures:  ExamWhile patients are awake, the muscles that surround the throat are active and keep this region open for breathing. These muscles relax during sleep, allowing the tongue and other structures to collapse and block breathing.  There are several different tongue procedures available.  Selection of a tongue base procedure depends on characteristics seen on physical exam.  Generally, procedures are aimed at removing bulky tissues in this area or preventing the back of the tongue from falling back during sleep.  Success rates for tongue surgery range from 50-62%3.    Hypoglossal Nerve Stimulation:  Hypoglossal nerve stimulation has recently received approval from the [place] Food and Drug Administration for the treatment of obstructive sleep apnea.  This is based on research showing that the system was safe and effective in treating sleep apnea6.  Results showed that the median AHI score decreased 68%, from 29.3 to 9.0. This therapy uses an implant system that senses breathing patterns and delivers mild stimulation to airway muscles, which keeps the airway open during sleep.  The system consists of three fully implanted components: a small generator (similar in size to a pacemaker), a breathing sensor, and a stimulation lead.  Using a small handheld remote, a patient turns the therapy on before bed and off upon awakening.    Candidates for this device must be greater than 22 years of age, have moderate to severe ARISTEO (AHI between 20-65), BMI less than 32, have tried CPAP/oral appliance without success, and have appropriate upper airway anatomy (determined by a sleep endoscopy performed by Dr. Sifuentes).    Hypoglossal Nerve Stimulation  Pathway:    The sleep surgeon s office will work with the patient through the insurance prior-authorization process (including communications and appeals).    Nasal Procedures:  Nasal obstruction can interfere with nasal breathing during the day and night.  Studies have shown that relief of nasal obstruction can improve the ability of some patients to tolerate positive airway pressure therapy for obstructive sleep apnea1.  Treatment options include medications such as nasal saline, topical corticosteroid and antihistamine sprays, and oral medications such as antihistamines or decongestants. Non-surgical treatments can include external nasal dilators for selected patients. If these are not successful by themselves, surgery can improve the nasal airway either alone or in combination with these other options.      Combination Procedures:  Combination of surgical procedures and other treatments may be recommended, particularly if patients have more than one area of narrowing or persistent positional disease.  The success rate of combination surgery ranges from 66-80%2,3.    References  1. Sae LONG. The Role of the Nose in Snoring and Obstructive Sleep Apnoea: An Update.  Eur Arch Otorhinolaryngol. 2011; 268: 1365-73.  2.  Manjit SM; Sorin JA; Tammi JR; Pallanch JF; Mónica MB; Chrissy SG; Lexi BANDA. Surgical modifications of the upper airway for obstructive sleep apnea in adults: a systematic review and meta-analysis. SLEEP 2010;33(10):6516-2811. Rodríguez JJ. Hypopharyngeal surgery in obstructive sleep apnea: an evidence-based medicine review.  Arch Otolaryngol Head Neck Surg. 2006 Feb;132(2):206-13.  3. Sarkis YH1, Cristela Y, Paul ELIOT. The efficacy of anatomically based multilevel surgery for obstructive sleep apnea. Otolaryngol Head Neck Surg. 2003 Oct;129(4):327-35.  4. Rodríguez JJ, Goldberg A. Hypopharyngeal Surgery in Obstructive Sleep Apnea: An Evidence-Based Medicine Review. Arch Otolaryngol Head Neck Surg. 2006  Feb;132(2):206-13.  5. Bambi RECINOS et al. Upper-Airway Stimulation for Obstructive Sleep Apnea.  N Engl J Med. 2014 Jan 9;370(2):139-49.  6. Taniya Y et al. Increased Incidence of Cardiovascular Disease in Middle-aged Men with Obstructive Sleep Apnea. Am J Respir Crit Care Med; 2002 166: 159-165  7. Michael EM et al. Studying Life Effects and Effectiveness of Palatopharyngoplasty (SLEEP) study: Subjective Outcomes of Isolated Uvulopalatopharyngoplasty. Otolaryngol Head Neck Surg. 2011; 144: 623-631.            Your BMI is Body mass index is 37.34 kg/(m^2).  Weight management is a personal decision.  If you are interested in exploring weight loss strategies, the following discussion covers the approaches that may be successful. Body mass index (BMI) is one way to tell whether you are at a healthy weight, overweight, or obese. It measures your weight in relation to your height.  A BMI of 18.5 to 24.9 is in the healthy range. A person with a BMI of 25 to 29.9 is considered overweight, and someone with a BMI of 30 or greater is considered obese. More than two-thirds of American adults are considered overweight or obese.  Being overweight or obese increases the risk for further weight gain. Excess weight may lead to heart disease and diabetes.  Creating and following plans for healthy eating and physical activity may help you improve your health.  Weight control is part of healthy lifestyle and includes exercise, emotional health, and healthy eating habits. Careful eating habits lifelong are the mainstay of weight control. Though there are significant health benefits from weight loss, long-term weight loss with diet alone may be very difficult to achieve- studies show long-term success with dietary management in less than 10% of people. Attaining a healthy weight may be especially difficult to achieve in those with severe obesity. In some cases, medications, devices and surgical management might be considered.  What can you  do?  If you are overweight or obese and are interested in methods for weight loss, you should discuss this with your provider.     Consider reducing daily calorie intake by 500 calories.     Keep a food journal.     Avoiding skipping meals, consider cutting portions instead.    Diet combined with exercise helps maintain muscle while optimizing fat loss. Strength training is particularly important for building and maintaining muscle mass. Exercise helps reduce stress, increase energy, and improves fitness. Increasing exercise without diet control, however, may not burn enough calories to loose weight.       Start walking three days a week 10-20 minutes at a time    Work towards walking thirty minutes five days a week     Eventually, increase the speed of your walking for 1-2 minutes at time    In addition, we recommend that you review healthy lifestyles and methods for weight loss available through the National Institutes of Health patient information sites:  http://win.niddk.nih.gov/publications/index.htm    And look into health and wellness programs that may be available through your health insurance provider, employer, local community center, or nazanin club.    Weight management plan: Patient was referred to their PCP to discuss a diet and exercise plan.

## 2017-10-12 NOTE — Clinical Note
Copy of Dictation to: Ashley Mott NP Address [id number] [facility]  [place] [address]   Sudha Rodríguez [facility]

## 2017-11-15 ENCOUNTER — THERAPY VISIT (OUTPATIENT)
Dept: SLEEP MEDICINE | Facility: CLINIC | Age: 72
End: 2017-11-15
Attending: INTERNAL MEDICINE
Payer: COMMERCIAL

## 2017-11-15 DIAGNOSIS — G47.33 OBSTRUCTIVE SLEEP APNEA: ICD-10-CM

## 2017-11-15 PROCEDURE — 95810 POLYSOM 6/> YRS 4/> PARAM: CPT | Mod: ZF

## 2017-11-15 NOTE — MR AVS SNAPSHOT
After Visit Summary   11/15/2017    Aneduy Bergeron    MRN: [id number]           Patient Information     Date Of Birth          1945        Visit Information        Provider Department      11/15/2017 8:00 PM [facility]EP STUDY [facility]        Today's Diagnoses     Obstructive sleep apnea          Care Instructions    [place] [facility]    1. Your sleep study will be reviewed by a sleep physician within the next few days.     2. Please follow up in the sleep clinic as scheduled, or, make an appointment with your sleep provider to be seen within two weeks to discuss the results of the sleep study.    3. If you have any questions or problems with your treatment plan, please contact your sleep clinic provider at [phone] to further manage your condition.    4. Please review your attached medication list, and, at your follow-up appointment advise your sleep clinic provider about any changes.    5. Go to [email or web address] for more information about your sleep problems.    Jenny Ayala [facility]  November 15, 2017                Follow-ups after your visit        Your next 10 appointments already scheduled     Nov 30, 2017  3:00 PM CST   Return Sleep Patient with Geoff Salazar MD   [facility] [place], Sleep Study ([facility])    [address] [place] [id number] [phone]              Who to contact     If you have questions or need follow up information about today's clinic visit or your schedule please contact [facility] directly at [phone].  Normal or non-critical lab and imaging results will be communicated to you by MyChart, letter or phone within 4 business days after the clinic has received the results. If you do not hear from us within 7 days, please contact the clinic through MyChart or phone. If you have a critical or abnormal lab result, we will notify you by phone as  "soon as possible.  Submit refill requests through "[employer]" or call your pharmacy and they will forward the refill request to us. Please allow 3 business days for your refill to be completed.          Additional Information About Your Visit        [employer] Are [employer] Information     "[employer] lets you send messages to your doctor, view your test results, renew your prescriptions, schedule appointments and more. To sign up, go to [email or web address]/"[employer]" . Click on \"Log in\" on the left side of the screen, which will take you to the Welcome page. Then click on \"Sign up Now\" on the right side of the page.     You will be asked to enter the access code listed below, as well as some personal information. Please follow the directions to create your username and password.     Your access code is: [email or web address]  Expires: 1/10/2018 10:09 AM     Your access code will  in 90 days. If you need help or a new code, please call your [place] clinic or [phone].        Care EveryWhere ID     This is your Care EveryWhere ID. This could be used by other organizations to access your [place] medical records  [id number]         Blood Pressure from Last 3 Encounters:   10/12/17 116/68    Weight from Last 3 Encounters:   10/12/17 128.4 kg (283 lb)              We Performed the Following     Comprehensive Sleep Study        Primary Care Provider Office Phone # Fax #    Charlene Mott [phone]       [facility] PEDIATRICS [address] [address]        Equal Access to Services     [facility]: Hadii aad ku hadasho Soomaali, waaxda luqadaha, qaybta kaalmada adeegyada, waxay jodi lopez . So [facility] [phone].    ATENCIÓN: Si habla español, tiene a tran disposición servicios gratuitos de asistencia lingüística. Llame al [phone].    We comply with applicable federal civil rights laws and [place] laws. We do not discriminate on the basis of race, color, national origin, " age, disability, sex, sexual orientation, or gender identity.            Thank you!     Thank you for choosing [facility] [place], SLEEP STUDY  for your care. Our goal is always to provide you with excellent care. Hearing back from our patients is one way we can continue to improve our services. Please take a few minutes to complete the written survey that you may receive in the mail after your visit with us. Thank you!             Your Updated Medication List - Protect others around you: Learn how to safely use, store and throw away your medicines at www.disposemymeds.org.          This list is accurate as of: 11/15/17  8:43 PM.  Always use your most recent med list.                   Brand Name Dispense Instructions for use Diagnosis    albuterol 108 (90 BASE) MCG/ACT Inhaler    PROAIR HFA/PROVENTIL HFA/VENTOLIN HFA     Inhale 2 puffs into the lungs 4 times daily        allopurinol 100 MG tablet    ZYLOPRIM     Take 200 mg by mouth daily        aspirin 81 MG tablet      Take 1 tablet by mouth daily        bumetanide 1 MG tablet    BUMEX     Take 1 mg by mouth daily        colchicine 0.6 MG tablet      Take 0.6 mg by mouth every other day        cyanocobalamin 1000 MCG Tabs      Take 1 tablet by mouth daily        docusate sodium 50 MG capsule    COLACE     Take 50 mg by mouth 2 times daily        fish oil-omega-3 fatty acids 1000 MG capsule      Take 1 g by mouth daily        gabapentin 400 MG capsule    NEURONTIN     Take 300 mg by mouth 3 times daily Take 2 tablets every morning, at noon and every evening and take 3 tablets at bedtime.        lisinopril 20 MG tablet    PRINIVIL/ZESTRIL     Take 10 mg by mouth daily        metoprolol 25 MG 24 hr tablet    TOPROL-XL     Take 0.5 tablets by mouth At Bedtime        morphine 15 MG IR tablet    MSIR     Take 15 mg by mouth every 8 hours as needed        multivitamin  with lutein Caps per capsule      Take 1 capsule by mouth 2 times daily        naloxone nasal spray     NARCAN     Spray 4 mg into one nostril alternating nostrils as needed for opioid reversal every 2-3 minutes until assistance arrives        simvastatin 20 MG tablet    ZOCOR     Take 1 tablet by mouth At Bedtime        spironolactone 12.5 mg Tabs half-tab    ALDACTONE     Take 12.5 mg by mouth daily        tamsulosin 0.4 MG capsule    FLOMAX     Take 0.4 mg by mouth At Bedtime        warfarin 5 MG tablet    COUMADIN     Take 5 mg by mouth daily Take half tablet on Mondays.

## 2017-11-16 NOTE — PATIENT INSTRUCTIONS
[place] SLEEP [facility]    1. Your sleep study will be reviewed by a sleep physician within the next few days.     2. Please follow up in the sleep clinic as scheduled, or, make an appointment with your sleep provider to be seen within two weeks to discuss the results of the sleep study.    3. If you have any questions or problems with your treatment plan, please contact your sleep clinic provider at [phone] to further manage your condition.    4. Please review your attached medication list, and, at your follow-up appointment advise your sleep clinic provider about any changes.    5. Go to [email or web address] for more information about your sleep problems.    Jenny Ayala, RPSGT  November 15, 2017

## 2017-11-17 NOTE — PROGRESS NOTES
" SLEEP STUDY INTERPRETATION  POLYSOMNOGRAPHY REPORT      Patient: Aneudy Bergeron  YOB: 1945  Study Date: 11/15/2017  MRN: [id number]  Referring Provider: [facility]  Ordering Provider: Geoff Salazar MD    Indications for Polysomnography: The patient is a 72 y old Male who is 6' 1\" and weighs 283.0 lbs.  His BMI is 37.5, [place] sleepiness scale 4.0 and neck size is 46.0.  Relevant medical history includes cardiomyopathy with reduced EF (25%) and chronic pain with opiate use. A diagnostic polysomnogram was performed to evaluate for sleep apnea.    Polysomnogram Data:  A full night polysomnogram recorded the standard physiologic parameters including EEG, EOG, EMG, ECG, nasal and oral airflow.  Respiratory parameters of chest and abdominal movements were recorded with respiratory inductance plethysmography.  Oxygen saturation was recorded by pulse oximetry.      Sleep Architecture: Decreased sleep efficiency with prolonged sleep onset latency. Stage N3 sleep not seen.   The total recording time of the polysomnogram was 422.0 minutes.  The total sleep time was 281.0 minutes.  Sleep latency was increased at 40.4 minutes without the use of a sleep aid.  REM latency was 120.0 minutes.  Arousal index was normal at 9.6 arousals per hour.  Sleep efficiency was decreased at 66.6%.  Wake after sleep onset was 28.5 minutes.  The patient spent 8.0% of total sleep time in Stage N1, 76.9% in Stage N2, 0.0% in Stages N3, and 15.1% in REM.  Time in REM supine was 13.5 minutes.    Respiration: Mild obstructive sleep apnea with significant worsening in REM sleep, particularly in supine position. Central AHI <15%. No hypoventilation but hypoxemia noted.     Events - The polysomnogram revealed a presence of 14 obstructive, 0 central, and 0 mixed apneas resulting in an apnea index of 3.0 events per hour.  There were 29 hypopneas resulting in a hypopnea index of 6.2 events per hour.  The combined apnea/hypopnea index was " 9.2 events per hour.  The REM AHI was 39.5 events per hour.  The supine AHI was 11.2 events per hour.  The RERA index was 1.3 events per hour.   The RDI was 10.5 events per hour.    Snoring - was reported as moderately loud and intermittent, mostly during supine position.    Respiratory rate and pattern - was notable for normal respiratory rate and pattern.    Sustained Sleep Associated Hypoventilation - Transcutaneous carbon dioxide monitoring was used, however significant hypoventilation was not present with a range of 43-52 mmHg.    Sleep Associated Hypoxemia - (Greater than 5 minutes O2 sat below 89%) was present.  Baseline oxygen saturation was 94.7%. Lowest oxygen saturation was 67.1%.  Time spent less than or equal to 88% was 11.9 minutes.  Time spent less than or equal to 89% was 12.8 minutes.  10.5 1.3 9.2     Movement Activity: No movement abnormalities noted.     Periodic Limb Activity - There were 4 PLMs during the entire study. The PLM index was 0.9 movements per hour.  The PLM Arousal Index was 0 per hour.    REM EMG Activity - Excessive transient / sustained muscle activity was not present.    Nocturnal Behavior - Abnormal sleep related behaviors were not noted during / arising out of NREM / REM sleep.    Bruxism - None apparent.    Cardiac Summary: Rare wide complex premature beats of reversed polarity were noted.   The average pulse rate was 60.1 bpm.  The minimum pulse rate was 47.8 bpm while the maximum pulse rate was 76.8 bpm. The rhythm is normal sinus. Arrhythmias were noted - premature ventricular contractions.       Assessment:       Overall mild obstructive sleep apnea with significant worsening in REM sleep, particularly in supine position. Central AHI <15%. No hypoventilation noted but significant hypoxemia noted.     Moderately loud snoring noted, mainly in supine position.    No movement abnormalities noted.    Rare PVCs noted.     Sleep architecture was abnormal due to decreased sleep  efficiency and absence of stage N3 sleep.     Recommendations:    Based on the presence of moderate obstructive sleep apnea, consider treatment with Auto-titrating PAP therapy with a range of 5 - 10 cmH2O. Patient should undergo overnight oximetry to confirm efficacy and to evaluate for treatment emergent sleep apnea while on CPAP. Recommend clinical follow up with sleep management team.    Advise regarding the risks of drowsy driving.    Suggest optimizing sleep schedule and avoiding sleep deprivation.    Weight management (BMI > 30).    Diagnostic Codes:     Obstructive Sleep Apnea G47.33    Sleep Hypoxemia/Hypoventilation G47.36                Electronically signed by   Nghia Li MD (11/17/17)             Range(%) Time in range (min) Time in range (%) Time in or below range (min) Time in or below range (%)   0.0 - 89.0 12.8 3.1% 12.8 3.1%   0.0 - 88.0 11.9 2.9% 11.9 2.9%      9.2 11.2 39.5

## 2017-11-20 ENCOUNTER — DOCUMENTATION ONLY (OUTPATIENT)
Dept: SLEEP MEDICINE | Facility: CLINIC | Age: 72
End: 2017-11-20

## 2017-11-20 NOTE — PROGRESS NOTES
[facility].       OCTAVIO Constantino  Sleep Clinic-Specialist,    Registered Medical Assistant   [facility]

## 2017-11-20 NOTE — PROGRESS NOTES
Scheduling letter sent/faxed to [facility] Sleep clinic to confirm that pt has been scheduled for sleep clinic follow up.     OCTAVIO Constantino  Sleep Clinic-Specialist,    Registered Medical Assistant   [facility]

## 2017-11-30 ENCOUNTER — OFFICE VISIT (OUTPATIENT)
Dept: SLEEP MEDICINE | Facility: CLINIC | Age: 72
End: 2017-11-30
Attending: INTERNAL MEDICINE
Payer: COMMERCIAL

## 2017-11-30 VITALS
RESPIRATION RATE: 18 BRPM | HEART RATE: 60 BPM | HEIGHT: 73 IN | SYSTOLIC BLOOD PRESSURE: 114 MMHG | DIASTOLIC BLOOD PRESSURE: 69 MMHG | WEIGHT: 287 LBS | OXYGEN SATURATION: 96 % | BODY MASS INDEX: 38.04 KG/M2

## 2017-11-30 DIAGNOSIS — G47.33 OBSTRUCTIVE SLEEP APNEA: Primary | ICD-10-CM

## 2017-11-30 PROCEDURE — 99211 OFF/OP EST MAY X REQ PHY/QHP: CPT | Mod: ZF

## 2017-11-30 NOTE — MR AVS SNAPSHOT
After Visit Summary   11/30/2017    Aneudy Bergeron    MRN: [id number]           Patient Information     Date Of Birth          1945        Visit Information        Provider Department      11/30/2017 3:00 PM Geoff Salazar MD [facility] [place], Sleep Study        Care Instructions      Your BMI is Body mass index is 37.87 kg/(m^2).  Weight management is a personal decision.  If you are interested in exploring weight loss strategies, the following discussion covers the approaches that may be successful. Body mass index (BMI) is one way to tell whether you are at a healthy weight, overweight, or obese. It measures your weight in relation to your height.  A BMI of 18.5 to 24.9 is in the healthy range. A person with a BMI of 25 to 29.9 is considered overweight, and someone with a BMI of 30 or greater is considered obese. More than two-thirds of American adults are considered overweight or obese.  Being overweight or obese increases the risk for further weight gain. Excess weight may lead to heart disease and diabetes.  Creating and following plans for healthy eating and physical activity may help you improve your health.  Weight control is part of healthy lifestyle and includes exercise, emotional health, and healthy eating habits. Careful eating habits lifelong are the mainstay of weight control. Though there are significant health benefits from weight loss, long-term weight loss with diet alone may be very difficult to achieve- studies show long-term success with dietary management in less than 10% of people. Attaining a healthy weight may be especially difficult to achieve in those with severe obesity. In some cases, medications, devices and surgical management might be considered.  What can you do?  If you are overweight or obese and are interested in methods for weight loss, you should discuss this with your provider.     Consider reducing daily calorie intake by 500 calories.     Keep a food journal.      Avoiding skipping meals, consider cutting portions instead.    Diet combined with exercise helps maintain muscle while optimizing fat loss. Strength training is particularly important for building and maintaining muscle mass. Exercise helps reduce stress, increase energy, and improves fitness. Increasing exercise without diet control, however, may not burn enough calories to loose weight.       Start walking three days a week 10-20 minutes at a time    Work towards walking thirty minutes five days a week     Eventually, increase the speed of your walking for 1-2 minutes at time    In addition, we recommend that you review healthy lifestyles and methods for weight loss available through the National Institutes of Health patient information sites:  http://win.niddk.nih.gov/publications/index.htm    And look into health and wellness programs that may be available through your health insurance provider, employer, local community center, or nazanin club.    Weight management plan: Patient was referred to their PCP to discuss a diet and exercise plan.              Follow-ups after your visit        Who to contact     If you have questions or need follow up information about today's clinic visit or your schedule please contact [facility] directly at [phone].  Normal or non-critical lab and imaging results will be communicated to you by [employer], letter or phone within 4 business days after the clinic has received the results. If you do not hear from us within 7 days, please contact the clinic through [employer] or phone. If you have a critical or abnormal lab result, we will notify you by phone as soon as possible.  Submit refill requests through [employer] or call your pharmacy and they will forward the refill request to us. Please allow 3 business days for your refill to be completed.          Additional Information About Your Visit        [employer] Information     [employer] lets you send messages to your  "doctor, view your test results, renew your prescriptions, schedule appointments and more. To sign up, go to [email or web address] . Click on \"Log in\" on the left side of the screen, which will take you to the Welcome page. Then click on \"Sign up Now\" on the right side of the page.     You will be asked to enter the access code listed below, as well as some personal information. Please follow the directions to create your username and password.     Your access code is: [email or web address]  Expires: 1/10/2018 10:09 AM     Your access code will  in 90 days. If you need help or a new code, please call your [place] clinic or [phone].        Care EveryWhere ID     This is your Care EveryWhere ID. This could be used by other organizations to access your [place] medical records  [id number]        Your Vitals Were     Pulse Respirations Height Pulse Oximetry BMI (Body Mass Index)       60 18 1.854 m (6' 1\") 96% 37.87 kg/m2        Blood Pressure from Last 3 Encounters:   17 114/69   10/12/17 116/68    Weight from Last 3 Encounters:   17 130.2 kg (287 lb)   10/12/17 128.4 kg (283 lb)              Today, you had the following     No orders found for display       Primary Care Provider Office Phone # Fax #    Charlene Mott [phone]       [facility] PEDIATRICS  [address] [address]        Equal Access to Services     [facility] : Hadii hannah heck hadasho Soomaali, waaxda luqadaha, qaybta kaalmada adeegyada, beverley le. So [facility] [phone].    ATENCIÓN: Si habla español, tiene a tran disposición servicios gratuitos de asistencia lingüística. Llame al [phone].    We comply with applicable federal civil rights laws and [place] laws. We do not discriminate on the basis of race, color, national origin, age, disability, sex, sexual orientation, or gender identity.            Thank you!     [facility] " [facility]UD[facility]  for your care. Our goal is always to provide you with excellent care. Hearing back from our patients is one way we can continue to improve our services. Please take a few minutes to complete the written survey that you may receive in the mail after your visit with us. Thank you!             Your Updated Medication List - Protect others around you: Learn how to safely use, store and throw away your medicines at www.disposemymeds.org.          This list is accurate as of: 11/30/17  3:02 PM.  Always use your most recent med list.                   Brand Name Dispense Instructions for use Diagnosis    albuterol 108 (90 BASE) MCG/ACT Inhaler    PROAIR HFA/PROVENTIL HFA/VENTOLIN HFA     Inhale 2 puffs into the lungs 4 times daily        allopurinol 100 MG tablet    ZYLOPRIM     Take 200 mg by mouth daily        aspirin 81 MG tablet      Take 1 tablet by mouth daily        bumetanide 1 MG tablet    BUMEX     Take 1 mg by mouth daily        colchicine 0.6 MG tablet      Take 0.6 mg by mouth every other day        cyanocobalamin 1000 MCG Tabs      Take 1 tablet by mouth daily        docusate sodium 50 MG capsule    COLACE     Take 50 mg by mouth 2 times daily        fish oil-omega-3 fatty acids 1000 MG capsule      Take 1 g by mouth daily        gabapentin 400 MG capsule    NEURONTIN     Take 300 mg by mouth 3 times daily Take 2 tablets every morning, at noon and every evening and take 3 tablets at bedtime.        lisinopril 20 MG tablet    PRINIVIL/ZESTRIL     Take 10 mg by mouth daily        metoprolol 25 MG 24 hr tablet    TOPROL-XL     Take 0.5 tablets by mouth At Bedtime        morphine 15 MG IR tablet    MSIR     Take 15 mg by mouth every 8 hours as needed        multivitamin  with lutein Caps per capsule      Take 1 capsule by mouth 2 times daily        naloxone nasal spray    NARCAN     Spray 4 mg into one nostril alternating nostrils as needed for opioid reversal every 2-3 minutes until assistance  arrives        simvastatin 20 MG tablet    ZOCOR     Take 1 tablet by mouth At Bedtime        spironolactone 12.5 mg Tabs half-tab    ALDACTONE     Take 12.5 mg by mouth daily        tamsulosin 0.4 MG capsule    FLOMAX     Take 0.4 mg by mouth At Bedtime        warfarin 5 MG tablet    COUMADIN     Take 5 mg by mouth daily Take half tablet on Mondays.

## 2017-11-30 NOTE — NURSING NOTE
"Chief Complaint   Patient presents with     Sleep Study     Follow up to discuss PSG results       Initial /69  Pulse 60  Resp 18  Ht 1.854 m (6' 1\")  Wt 130.2 kg (287 lb)  SpO2 96%  BMI 37.87 kg/m2 Estimated body mass index is 37.87 kg/(m^2) as calculated from the following:    Height as of this encounter: 1.854 m (6' 1\").    Weight as of this encounter: 130.2 kg (287 lb).  Medication Reconciliation: complete     OCTAVIO Obrien        "

## 2017-11-30 NOTE — PATIENT INSTRUCTIONS

## 2017-12-01 ENCOUNTER — DOCUMENTATION ONLY (OUTPATIENT)
Dept: SLEEP MEDICINE | Facility: CLINIC | Age: 72
End: 2017-12-01

## 2017-12-01 NOTE — PROGRESS NOTES
Mr. Tor Hogan is a 72-year-old gentleman with cardiomyopathy and severe obesity who is seen following his overnight sleep testing performed at a 30 degree elevation of the head of his bed for supine dyspnea and without oxygen.  This study demonstrated only mild sleep apnea with an apnea/hypopnea index of 9, however, he did have 12 minutes of hypoxemia that was restricted primarily to REM sleep with desaturations as low as 67%.  He had rather insignificant central apneas and no woody Cheyne-Cabral breathing.  In this setting, we would recommend initiation of auto-titration CPAP in the range of 5-15 cm of water to prevent the REM related sleep apnea and hypoxemic events, primarily because of the association of hypoxemia and cardiovascular stress in the setting of his cardiomyopathy.  He will continue to use elevation of the head of the bed.  He will return here on a p.r.n. basis.      Total time spent with patient 25 minutes, greater than 50% counseling.         FIDENCIO BRYAN MD             D: 2017 15:04   T: 2017 04:45   MT: lg      Name:     TOR HOGAN   MRN:      [id number]        Account:      [id number]   :      1945           Visit Date:   2017      Document: [id number]       cc: Leann West MD Peter Duane MD

## 2017-12-01 NOTE — PROGRESS NOTES
[facility] of [facility].     OCTAVIO Constantino  Sleep Clinic-Specialist,    Registered Medical Assistant   [facility]

## 2024-08-19 ENCOUNTER — DOCUMENTATION ONLY (OUTPATIENT)
Dept: GERIATRICS | Facility: CLINIC | Age: 79
End: 2024-08-19

## 2024-08-19 ENCOUNTER — TRANSITIONAL CARE UNIT VISIT (OUTPATIENT)
Dept: GERIATRICS | Facility: CLINIC | Age: 79
End: 2024-08-19
Payer: COMMERCIAL

## 2024-08-19 ENCOUNTER — LAB REQUISITION (OUTPATIENT)
Dept: LAB | Facility: CLINIC | Age: 79
End: 2024-08-19

## 2024-08-19 VITALS
BODY MASS INDEX: 35.52 KG/M2 | HEIGHT: 73 IN | TEMPERATURE: 96.4 F | DIASTOLIC BLOOD PRESSURE: 52 MMHG | WEIGHT: 268 LBS | RESPIRATION RATE: 16 BRPM | HEART RATE: 63 BPM | SYSTOLIC BLOOD PRESSURE: 110 MMHG | OXYGEN SATURATION: 98 %

## 2024-08-19 DIAGNOSIS — R53.81 PHYSICAL DECONDITIONING: Primary | ICD-10-CM

## 2024-08-19 DIAGNOSIS — I48.20 CHRONIC ATRIAL FIBRILLATION (H): ICD-10-CM

## 2024-08-19 DIAGNOSIS — I25.10 CORONARY ARTERY DISEASE INVOLVING NATIVE CORONARY ARTERY OF NATIVE HEART WITHOUT ANGINA PECTORIS: ICD-10-CM

## 2024-08-19 DIAGNOSIS — M47.12 CERVICAL SPONDYLOSIS WITH MYELOPATHY: ICD-10-CM

## 2024-08-19 DIAGNOSIS — I50.22 CHRONIC SYSTOLIC CONGESTIVE HEART FAILURE (H): ICD-10-CM

## 2024-08-19 DIAGNOSIS — I50.22 CHRONIC SYSTOLIC (CONGESTIVE) HEART FAILURE (H): ICD-10-CM

## 2024-08-19 DIAGNOSIS — J44.9 CHRONIC OBSTRUCTIVE PULMONARY DISEASE, UNSPECIFIED COPD TYPE (H): ICD-10-CM

## 2024-08-19 DIAGNOSIS — I10 ESSENTIAL HYPERTENSION: ICD-10-CM

## 2024-08-19 DIAGNOSIS — N18.32 STAGE 3B CHRONIC KIDNEY DISEASE (H): ICD-10-CM

## 2024-08-19 PROCEDURE — 99309 SBSQ NF CARE MODERATE MDM 30: CPT | Performed by: NURSE PRACTITIONER

## 2024-08-19 RX ORDER — GABAPENTIN 300 MG/1
300 CAPSULE ORAL 3 TIMES DAILY
COMMUNITY

## 2024-08-19 RX ORDER — LISINOPRIL 5 MG/1
5 TABLET ORAL DAILY
COMMUNITY

## 2024-08-19 RX ORDER — LIDOCAINE 4 G/G
1 PATCH TOPICAL EVERY 24 HOURS
COMMUNITY

## 2024-08-19 RX ORDER — FINASTERIDE 5 MG/1
5 TABLET, FILM COATED ORAL DAILY
COMMUNITY

## 2024-08-19 RX ORDER — AMOXICILLIN 250 MG
2 CAPSULE ORAL 2 TIMES DAILY
COMMUNITY

## 2024-08-19 RX ORDER — ACETAMINOPHEN 500 MG
1000 TABLET ORAL EVERY 8 HOURS PRN
COMMUNITY

## 2024-08-19 RX ORDER — POLYETHYLENE GLYCOL 3350 17 G/17G
17 POWDER, FOR SOLUTION ORAL DAILY
COMMUNITY

## 2024-08-19 RX ORDER — AMIODARONE HYDROCHLORIDE 200 MG/1
400 TABLET ORAL DAILY
COMMUNITY

## 2024-08-19 RX ORDER — DULOXETIN HYDROCHLORIDE 60 MG/1
60 CAPSULE, DELAYED RELEASE ORAL DAILY
COMMUNITY

## 2024-08-19 RX ORDER — ROSUVASTATIN CALCIUM 20 MG/1
20 TABLET, COATED ORAL DAILY
COMMUNITY

## 2024-08-19 NOTE — LETTER
8/19/2024      Aneudy Bergeron  [address] [facility] [address]        [facility] GERIATRICS    PRIMARY CARE PROVIDER AND CLINIC:  Charlene Mott MD, None  Chief Complaint   Patient presents with     Hospital F/U      [place] Medical Record Number:  [id number]  Place of Service where encounter took place:  [facility] (TCU) [[id number]]    Aneudy Bergeron  is a 79 year old  (1945), admitted to the above facility from  [facility] . Hospital stay 8/16/24 through 8/17/24. Limited information available [facility] PMH includes BiV ICD, afib, CAD, HTN, CHF, COPD, CKD, chronic pain due to cervical spondylosis, and gout. He was recently discharged from the cardiology service after an ablation for V-tach (8/7/24). His ICD had fired twice and he was not aware. He also had a CHF exacerbation that stay, EF 37%. He wanted to trial going home, but this did not go well due to weakness, [facility]      HPI obtained from patient visit, review of nursing home record, discussion with facility staff, and Epic review.     HPI:    Patient is an excellent historian. He talks about his cardiac issues and the procedure he had. He says he had been feeling unwell at home for several days. It wasn't anything specific, just a little woozy, unsteady, and he did pass out before his ICD fired. He did not know it fired, [employer] called him. He is currently sitting in an electric chair, but says he does walk at home at baseline. They have a split level house, and he could not manage the stairs when he went home this time. He and his wife are planning on moving somewhere that will be all on 1 level, but she is currently dealing with her brother that is on hospice in [place]. He does not have any acute concerns today. His right leg is always swollen. His left ankle gets swollen at times, but it is not bad now. No respiratory  symptoms.     CODE STATUS/ADVANCE DIRECTIVES DISCUSSION:  Full Code    ALLERGIES:   Allergies   Allergen Reactions     Amlodipine      Pcn [Penicillins]       PAST MEDICAL HISTORY:   Past Medical History:   Diagnosis Date     Exudative senile macular degeneration of retina (H)      HTN (hypertension)      Retinal neovascularization NOS      Senile nuclear sclerosis       PAST SURGICAL HISTORY:   has a past surgical history that includes Eylea (Aflibercept) 2 mg Intravitreal Injection OD (right eye) and Eylea (Aflibercept) 2 mg Intravitreal Injection OS (left eye).  FAMILY HISTORY: family history is not on file.  SOCIAL HISTORY:   reports that he has been smoking. He has never used smokeless tobacco. He reports that he does not drink alcohol and does not use drugs.  Patient's living condition: lives with spouse    Post Discharge Medication Reconciliation Status:   MED REC REQUIRED  Post Medication Reconciliation Status:  Discharge medications reconciled, continue medications without change       Current Outpatient Medications   Medication Sig Dispense Refill     acetaminophen (TYLENOL) 500 MG tablet Take 1,000 mg by mouth every 8 hours as needed for mild pain       albuterol (PROAIR HFA/PROVENTIL HFA/VENTOLIN HFA) 108 (90 BASE) MCG/ACT Inhaler Inhale 2 puffs into the lungs 4 times daily as needed for shortness of breath       allopurinol (ZYLOPRIM) 100 MG tablet Take 200 mg by mouth daily       amiodarone (PACERONE) 200 MG tablet Take 400 mg by mouth daily       apixaban ANTICOAGULANT (ELIQUIS) 5 MG tablet Take 5 mg by mouth 2 times daily       DULoxetine (CYMBALTA) 60 MG capsule Take 60 mg by mouth daily       finasteride (PROSCAR) 5 MG tablet Take 5 mg by mouth daily       gabapentin (NEURONTIN) 300 MG capsule Take 300 mg by mouth 3 times daily       Lidocaine (LIDOCARE) 4 % Patch Place 1 patch onto the skin every 24 hours To prevent lidocaine toxicity, patient should be patch free for 12 hrs daily.        "lisinopril (ZESTRIL) 5 MG tablet Take 5 mg by mouth daily       metoprolol (TOPROL-XL) 25 MG 24 hr tablet Take 0.5 tablets by mouth At Bedtime        morphine (MSIR) 15 MG tablet Take 15 mg by mouth 3 times daily       multivitamin  with lutein (OCUVITE WITH LTEIN) CAPS Take 1 capsule by mouth 2 times daily       naloxone (NARCAN) nasal spray Spray 4 mg into one nostril alternating nostrils as needed for opioid reversal every 2-3 minutes until assistance arrives       polyethylene glycol (MIRALAX) 17 GM/Dose powder Take 17 g by mouth daily       rosuvastatin (CRESTOR) 20 MG tablet Take 20 mg by mouth daily       senna-docusate (SENOKOT-S/PERICOLACE) 8.6-50 MG tablet Take 2 tablets by mouth 2 times daily       tamsulosin (FLOMAX) 0.4 MG capsule Take 0.4 mg by mouth At Bedtime       umeclidinium-vilanterol (ANORO ELLIPTA) 62.5-25 MCG/ACT oral inhaler Inhale 1 puff into the lungs daily       No current facility-administered medications for this visit.       ROS:  4 point ROS including Respiratory, CV, GI and , other than that noted in the HPI,  is negative    Vitals:  /52   Pulse 63   Temp (!) 96.4  F (35.8  C)   Resp 16   Ht 1.854 m (6' 1\")   Wt 121.6 kg (268 lb)   SpO2 98%   BMI 35.36 kg/m    Exam:  GENERAL APPEARANCE:  Alert, in no distress  ENT:  Mouth and posterior oropharynx normal, moist mucous membranes, normal hearing acuity  EYES:  EOM normal, conjunctiva and lids normal  RESP:  respiratory effort and palpation of chest normal, lungs clear to auscultation , no respiratory distress  CV:  regular rate and rhythm, no murmur, rub, or gallop, peripheral edema 2+ in RLE, trace in left ankle  PSYCH:  oriented X 3, affect and mood normal    Lab/Diagnostic data:  Recent labs in [facility] reviewed by me today.       ASSESSMENT/PLAN:  (R53.81) Physical deconditioning  (primary encounter diagnosis)  Comment: Due to acute illness, previous hospitalization  Plan: PT/OT eval and treat, discharge planning per their " recommendations.    (I48.20) Chronic atrial fibrillation (H)  Comment: Rate controlled. On DOAC for anticoagulation with no s/sx side effects  Plan: Continue current POC with no changes at this time and adjustments as needed.    (I50.22) Chronic ischemic systolic congestive heart failure (H) EF 40-45%  Comment: Chronic: CHF due to effects of HTN/Heart Disease. Currently: compensated.8 He is not on diuretics at this time, has been on bumex and aldactone in the past.   Plan: Monitor weights q day. Call provider if greater than 3 pound gain from previous weight. Monitor for shortness of breath, wheezing, increasing lower extremity edema and change in activity tolerance.     (I25.10) Coronary artery disease involving native coronary artery of native heart without angina pectoris  Comment: Asymptomatic  Plan: Continue secondary prevention    (I10) Essential hypertension  Comment: Currently controlled  Plan: Continue current POC with no changes at this time. Monitor BP. Adjust medication as clinically indicated.    (N18.32) Stage 3b chronic kidney disease (H)  Comment: Based on available labs, Stage is 3b  Plan: Monitor BMP. Avoid nephrotoxic medications and adjust medications per renal function.     (J44.9) Chronic obstructive pulmonary disease, unspecified COPD type (H)  Comment: Chronic condition being managed with medications and is currently asymptomatic.  Plan: Continue current POC with no changes at this time and adjustments as needed.    (M47.12) Cervical spondylosis with myelopathy  Comment: Chronic, no acute concerns      Orders:  BMP 8/21/24      Electronically signed by:  LETY Dickerson CNP                     Sincerely,        LETY Dickerson CNP

## 2024-08-19 NOTE — PROGRESS NOTES
[facility] GERIATRICS    PRIMARY CARE PROVIDER AND CLINIC:  Charlene Mott MD, None  Chief Complaint   Patient presents with    Hospital F/U      [place] Medical Record Number:  [id number]  Place of Service where encounter took place:  [facility] (TCU) [[id number]]    Aneudy Bergeron  is a 79 year old  (1945), admitted to the above facility from  [facility] . Hospital stay 8/16/24 through 8/17/24. Limited information available due [facility]. PMH includes BiV ICD, afib, CAD, HTN, CHF, COPD, CKD, chronic pain due to cervical spondylosis, and gout. He was recently discharged from the cardiology service after an ablation for V-tach (8/7/24). His ICD had fired twice and he was not aware. He also had a CHF exacerbation that stay, EF 37%. He wanted to trial going home, but this did not go well due to weakness, so [facility].      HPI obtained from patient visit, review of nursing home record, discussion with facility staff, and Epic review.     HPI:    Patient is an excellent historian. He talks about his cardiac issues and the procedure he had. He says he had been feeling unwell at home for several days. It wasn't anything specific, just a little woozy, unsteady, and he did pass out before his ICD fired. He did not know it fired, [employer] called him. He is currently sitting in an electric chair, but says he does walk at home at baseline. They have a split level house, and he could not manage the stairs when he went home this time. He and his wife are planning on moving somewhere that will be all on 1 level, but she is currently dealing with her brother that is on hospice in [place]. He does not have any acute concerns today. His right leg is always swollen. His left ankle gets swollen at times, but it is not bad now. No respiratory symptoms.     CODE STATUS/ADVANCE DIRECTIVES DISCUSSION:  Full Code    ALLERGIES:   Allergies    Allergen Reactions    Amlodipine     Pcn [Penicillins]       PAST MEDICAL HISTORY:   Past Medical History:   Diagnosis Date    Exudative senile macular degeneration of retina (H)     HTN (hypertension)     Retinal neovascularization NOS     Senile nuclear sclerosis       PAST SURGICAL HISTORY:   has a past surgical history that includes Eylea (Aflibercept) 2 mg Intravitreal Injection OD (right eye) and Eylea (Aflibercept) 2 mg Intravitreal Injection OS (left eye).  FAMILY HISTORY: family history is not on file.  SOCIAL HISTORY:   reports that he has been smoking. He has never used smokeless tobacco. He reports that he does not drink alcohol and does not use drugs.  Patient's living condition: lives with spouse    Post Discharge Medication Reconciliation Status:   MED REC REQUIRED  Post Medication Reconciliation Status:  Discharge medications reconciled, continue medications without change       Current Outpatient Medications   Medication Sig Dispense Refill    acetaminophen (TYLENOL) 500 MG tablet Take 1,000 mg by mouth every 8 hours as needed for mild pain      albuterol (PROAIR HFA/PROVENTIL HFA/VENTOLIN HFA) 108 (90 BASE) MCG/ACT Inhaler Inhale 2 puffs into the lungs 4 times daily as needed for shortness of breath      allopurinol (ZYLOPRIM) 100 MG tablet Take 200 mg by mouth daily      amiodarone (PACERONE) 200 MG tablet Take 400 mg by mouth daily      apixaban ANTICOAGULANT (ELIQUIS) 5 MG tablet Take 5 mg by mouth 2 times daily      DULoxetine (CYMBALTA) 60 MG capsule Take 60 mg by mouth daily      finasteride (PROSCAR) 5 MG tablet Take 5 mg by mouth daily      gabapentin (NEURONTIN) 300 MG capsule Take 300 mg by mouth 3 times daily      Lidocaine (LIDOCARE) 4 % Patch Place 1 patch onto the skin every 24 hours To prevent lidocaine toxicity, patient should be patch free for 12 hrs daily.      lisinopril (ZESTRIL) 5 MG tablet Take 5 mg by mouth daily      metoprolol (TOPROL-XL) 25 MG 24 hr tablet Take 0.5  "tablets by mouth At Bedtime       morphine (MSIR) 15 MG tablet Take 15 mg by mouth 3 times daily      multivitamin  with lutein (OCUVITE WITH LTEIN) CAPS Take 1 capsule by mouth 2 times daily      naloxone (NARCAN) nasal spray Spray 4 mg into one nostril alternating nostrils as needed for opioid reversal every 2-3 minutes until assistance arrives      polyethylene glycol (MIRALAX) 17 GM/Dose powder Take 17 g by mouth daily      rosuvastatin (CRESTOR) 20 MG tablet Take 20 mg by mouth daily      senna-docusate (SENOKOT-S/PERICOLACE) 8.6-50 MG tablet Take 2 tablets by mouth 2 times daily      tamsulosin (FLOMAX) 0.4 MG capsule Take 0.4 mg by mouth At Bedtime      umeclidinium-vilanterol (ANORO ELLIPTA) 62.5-25 MCG/ACT oral inhaler Inhale 1 puff into the lungs daily       No current facility-administered medications for this visit.       ROS:  4 point ROS including Respiratory, CV, GI and , other than that noted in the HPI,  is negative    Vitals:  /52   Pulse 63   Temp (!) 96.4  F (35.8  C)   Resp 16   Ht 1.854 m (6' 1\")   Wt 121.6 kg (268 lb)   SpO2 98%   BMI 35.36 kg/m    Exam:  GENERAL APPEARANCE:  Alert, in no distress  ENT:  Mouth and posterior oropharynx normal, moist mucous membranes, normal hearing acuity  EYES:  EOM normal, conjunctiva and lids normal  RESP:  respiratory effort and palpation of chest normal, lungs clear to auscultation , no respiratory distress  CV:  regular rate and rhythm, no murmur, rub, or gallop, peripheral edema 2+ in RLE, trace in left ankle  PSYCH:  oriented X 3, affect and mood normal    Lab/Diagnostic data:  Recent labs in [facility] reviewed by me today.       ASSESSMENT/PLAN:  (R53.81) Physical deconditioning  (primary encounter diagnosis)  Comment: Due to acute illness, previous hospitalization  Plan: PT/OT eval and treat, discharge planning per their recommendations.    (I48.20) Chronic atrial fibrillation (H)  Comment: Rate controlled. On DOAC for anticoagulation with " no s/sx side effects  Plan: Continue current POC with no changes at this time and adjustments as needed.    (I50.22) Chronic ischemic systolic congestive heart failure (H) EF 40-45%  Comment: Chronic: CHF due to effects of HTN/Heart Disease. Currently: compensated.8 He is not on diuretics at this time, has been on bumex and aldactone in the past.   Plan: Monitor weights q day. Call provider if greater than 3 pound gain from previous weight. Monitor for shortness of breath, wheezing, increasing lower extremity edema and change in activity tolerance.     (I25.10) Coronary artery disease involving native coronary artery of native heart without angina pectoris  Comment: Asymptomatic  Plan: Continue secondary prevention    (I10) Essential hypertension  Comment: Currently controlled  Plan: Continue current POC with no changes at this time. Monitor BP. Adjust medication as clinically indicated.    (N18.32) Stage 3b chronic kidney disease (H)  Comment: Based on available labs, Stage is 3b  Plan: Monitor BMP. Avoid nephrotoxic medications and adjust medications per renal function.     (J44.9) Chronic obstructive pulmonary disease, unspecified COPD type (H)  Comment: Chronic condition being managed with medications and is currently asymptomatic.  Plan: Continue current POC with no changes at this time and adjustments as needed.    (M47.12) Cervical spondylosis with myelopathy  Comment: Chronic, no acute concerns      Orders:  BMP 8/21/24      Electronically signed by:  LETY Dickerson CNP

## 2024-08-20 PROBLEM — I25.10 CORONARY ARTERY DISEASE INVOLVING NATIVE CORONARY ARTERY OF NATIVE HEART WITHOUT ANGINA PECTORIS: Status: ACTIVE | Noted: 2024-08-20

## 2024-08-20 PROBLEM — N18.32 STAGE 3B CHRONIC KIDNEY DISEASE (H): Status: ACTIVE | Noted: 2024-08-20

## 2024-08-20 PROBLEM — J44.9 CHRONIC OBSTRUCTIVE PULMONARY DISEASE, UNSPECIFIED COPD TYPE (H): Status: ACTIVE | Noted: 2024-08-20

## 2024-08-20 PROBLEM — I48.20 CHRONIC ATRIAL FIBRILLATION (H): Status: ACTIVE | Noted: 2024-08-20

## 2024-08-20 PROBLEM — M47.12 CERVICAL SPONDYLOSIS WITH MYELOPATHY: Status: ACTIVE | Noted: 2024-08-20

## 2024-08-21 LAB
ANION GAP SERPL CALCULATED.3IONS-SCNC: 7 MMOL/L (ref 7–15)
BUN SERPL-MCNC: 15.6 MG/DL (ref 8–23)
CALCIUM SERPL-MCNC: 9 MG/DL (ref 8.8–10.4)
CHLORIDE SERPL-SCNC: 102 MMOL/L (ref 98–107)
CREAT SERPL-MCNC: 1.47 MG/DL (ref 0.67–1.17)
EGFRCR SERPLBLD CKD-EPI 2021: 49 ML/MIN/1.73M2
GLUCOSE SERPL-MCNC: 125 MG/DL (ref 70–99)
HCO3 SERPL-SCNC: 28 MMOL/L (ref 22–29)
POTASSIUM SERPL-SCNC: 4.8 MMOL/L (ref 3.4–5.3)
SODIUM SERPL-SCNC: 137 MMOL/L (ref 135–145)

## 2024-08-21 PROCEDURE — 80048 BASIC METABOLIC PNL TOTAL CA: CPT | Performed by: INTERNAL MEDICINE

## 2024-08-21 PROCEDURE — 36415 COLL VENOUS BLD VENIPUNCTURE: CPT | Performed by: INTERNAL MEDICINE

## 2024-08-21 PROCEDURE — P9604 ONE-WAY ALLOW PRORATED TRIP: HCPCS | Performed by: INTERNAL MEDICINE

## 2024-08-26 ENCOUNTER — TRANSITIONAL CARE UNIT VISIT (OUTPATIENT)
Dept: GERIATRICS | Facility: CLINIC | Age: 79
End: 2024-08-26
Payer: COMMERCIAL

## 2024-08-26 VITALS
BODY MASS INDEX: 36.84 KG/M2 | TEMPERATURE: 97.8 F | HEART RATE: 58 BPM | HEIGHT: 72 IN | SYSTOLIC BLOOD PRESSURE: 98 MMHG | WEIGHT: 272 LBS | OXYGEN SATURATION: 93 % | RESPIRATION RATE: 16 BRPM | DIASTOLIC BLOOD PRESSURE: 44 MMHG

## 2024-08-26 DIAGNOSIS — Z86.79 STATUS POST ABLATION OF VENTRICULAR ARRHYTHMIA: ICD-10-CM

## 2024-08-26 DIAGNOSIS — I48.20 CHRONIC ATRIAL FIBRILLATION (H): ICD-10-CM

## 2024-08-26 DIAGNOSIS — I25.5 ISCHEMIC CARDIOMYOPATHY: ICD-10-CM

## 2024-08-26 DIAGNOSIS — M10.9 GOUT, UNSPECIFIED CAUSE, UNSPECIFIED CHRONICITY, UNSPECIFIED SITE: ICD-10-CM

## 2024-08-26 DIAGNOSIS — I25.10 CORONARY ARTERY DISEASE INVOLVING NATIVE CORONARY ARTERY OF NATIVE HEART WITHOUT ANGINA PECTORIS: Primary | ICD-10-CM

## 2024-08-26 DIAGNOSIS — Z98.890 STATUS POST ABLATION OF VENTRICULAR ARRHYTHMIA: ICD-10-CM

## 2024-08-26 DIAGNOSIS — M47.12 CERVICAL SPONDYLOSIS WITH MYELOPATHY: ICD-10-CM

## 2024-08-26 DIAGNOSIS — K59.01 SLOW TRANSIT CONSTIPATION: ICD-10-CM

## 2024-08-26 DIAGNOSIS — I12.9 BENIGN HYPERTENSIVE KIDNEY DISEASE WITH CHRONIC KIDNEY DISEASE STAGE I THROUGH STAGE IV, OR UNSPECIFIED: ICD-10-CM

## 2024-08-26 DIAGNOSIS — J44.9 CHRONIC OBSTRUCTIVE PULMONARY DISEASE, UNSPECIFIED COPD TYPE (H): ICD-10-CM

## 2024-08-26 DIAGNOSIS — N40.0 BENIGN PROSTATIC HYPERPLASIA, UNSPECIFIED WHETHER LOWER URINARY TRACT SYMPTOMS PRESENT: ICD-10-CM

## 2024-08-26 DIAGNOSIS — F33.0 MILD RECURRENT MAJOR DEPRESSION (H): ICD-10-CM

## 2024-08-26 DIAGNOSIS — Z95.810 ICD (IMPLANTABLE CARDIOVERTER-DEFIBRILLATOR) IN PLACE: ICD-10-CM

## 2024-08-26 DIAGNOSIS — R53.81 PHYSICAL DECONDITIONING: ICD-10-CM

## 2024-08-26 PROCEDURE — 99305 1ST NF CARE MODERATE MDM 35: CPT | Performed by: INTERNAL MEDICINE

## 2024-08-26 NOTE — LETTER
" 8/26/2024      Aneudy Bergeron  [address] [address]        [facility] GERIATRICS  INITIAL VISIT NOTE  August 26, 2024      PRIMARY CARE PROVIDER AND CLINIC:  Charlene Mott    [facility] Medical Record Number:  [id number]  Place of Service where encounter took place:  [facility] (U) [[id number]]    Chief Complaint   Patient presents with     Nursing Home Acute       HPI:    Aneudy Bergeron is a 79 year old  (1945) male seen today at [facility] TCU. Medical history is notable for CAD, ischemic cardiomyopathy (EF 37%) s/p BiV ICD, a-fib, HTN, COPD, cervical spondylosis, peripheral neuropathy. Limited records available for review from the [facility], but I believe was hospitalized at the [facility] from 8/7/24 to 8/13/24 for a VT ablation. TCU was recommended, but he wanted to try to go home where he realized he needed more help. He was again hospitalized at the [facility] from 8/16/24 to 8/17/24 for placement. He was admitted to this facility for  rehab, medical management, and nursing care.      History obtained from: facility chart records, facility staff, patient report, [facility] chart review, and [facility] as [facility] .      Today, Mr. Bergeron is seen sitting in his power wheelchair. He was out and about on the facility grounds prior looking for the black squirrel. Notes his BPs are usually 110s. Reviewed his current BPs - no dizziness or lightheadedness. Says he doesn't feel a low BP unless it's \"like 80/40\" Discussed a parameter for the lisinopril to hold <90 systolic and he is OK with this. He takes his metoprolol at bedtime at home. No chest pain. No dyspnea. Feels much better after the ablation - no longer having shakes. He knows his renal function - was 1.6 when he last had it checked at the [facility], down from 1.9 and knew his Cr of 1.47 last week. [facility] and is aware of both upcoming cardiology " appointments. No acute concerns today per discussion with nursing - talked about his BPs. He is working with therapies. Has a care conference today at 3pm.     CODE STATUS: CPR/Full code     ALLERGIES:  Allergies   Allergen Reactions     Amlodipine      Pcn [Penicillins]      Nicotine Rash       PAST MEDICAL HISTORY:   Past Medical History:   Diagnosis Date     Exudative senile macular degeneration of retina (H)      HTN (hypertension)      Retinal neovascularization NOS      Senile nuclear sclerosis        PAST SURGICAL HISTORY:   Past Surgical History:   Procedure Laterality Date     EYLEA (AFLIBERCEPT) 2MG INTRAVITREAL INJECTION OD (RIGHT EYE)      x 2 RE     EYLEA (AFLIBERCEPT) 2MG INTRAVITREAL INJECTION OS (LEFT EYE)      x 6 LE       FAMILY HISTORY:   Family History   Problem Relation Age of Onset     Glaucoma No family hx of      Macular Degeneration No family hx of      SOCIAL HISTORY:   Patient's living condition: lives with spouse    MEDICATIONS:  Post Discharge Medication Reconciliation Status: discharge medications reconciled and changed, per note/orders.  Current Outpatient Medications   Medication Sig Dispense Refill     acetaminophen (TYLENOL) 500 MG tablet Take 1,000 mg by mouth every 8 hours as needed for mild pain       albuterol (PROAIR HFA/PROVENTIL HFA/VENTOLIN HFA) 108 (90 BASE) MCG/ACT Inhaler Inhale 2 puffs into the lungs 4 times daily as needed for shortness of breath       allopurinol (ZYLOPRIM) 100 MG tablet Take 200 mg by mouth daily       amiodarone (PACERONE) 200 MG tablet Take 400 mg by mouth daily       apixaban ANTICOAGULANT (ELIQUIS) 5 MG tablet Take 5 mg by mouth 2 times daily       DULoxetine (CYMBALTA) 60 MG capsule Take 60 mg by mouth daily       finasteride (PROSCAR) 5 MG tablet Take 5 mg by mouth daily       gabapentin (NEURONTIN) 300 MG capsule Take 300 mg by mouth 3 times daily       Lidocaine (LIDOCARE) 4 % Patch Place 1 patch onto the skin every 24 hours To prevent  lidocaine toxicity, patient should be patch free for 12 hrs daily.       lisinopril (ZESTRIL) 5 MG tablet Take 5 mg by mouth daily. Hold SBP <90       metoprolol (TOPROL-XL) 25 MG 24 hr tablet Take 0.5 tablets by mouth At Bedtime        morphine (MSIR) 15 MG tablet Take 15 mg by mouth 3 times daily       multivitamin  with lutein (OCUVITE WITH LTEIN) CAPS Take 1 capsule by mouth 2 times daily       naloxone (NARCAN) nasal spray Spray 4 mg into one nostril alternating nostrils as needed for opioid reversal every 2-3 minutes until assistance arrives       nicotine (NICORETTE) 4 MG lozenge Place 4 mg inside cheek every 4 hours as needed for nicotine withdrawal symptoms.       polyethylene glycol (MIRALAX) 17 GM/Dose powder Take 17 g by mouth daily       rosuvastatin (CRESTOR) 20 MG tablet Take 20 mg by mouth daily       salicylic acid (MEDIPLAST) 40 % miscellaneous Apply topically daily as needed.       senna-docusate (SENOKOT-S/PERICOLACE) 8.6-50 MG tablet Take 2 tablets by mouth 2 times daily       tamsulosin (FLOMAX) 0.4 MG capsule Take 0.4 mg by mouth At Bedtime       umeclidinium-vilanterol (ANORO ELLIPTA) 62.5-25 MCG/ACT oral inhaler Inhale 1 puff into the lungs daily         ROS:  6 point ROS neg other than the symptoms noted above in the HPI.    PHYSICAL EXAM:  BP 98/44   Pulse 58   Temp 97.8  F (36.6  C)   Resp 16   Ht 1.829 m (6')   Wt 123.4 kg (272 lb)   SpO2 93%   BMI 36.89 kg/m    Gen: sitting power wheelchair, alert, cooperative and in no acute distress  Resp: breathing non labored, no tachypnea   Ext: mild dependent bilateral LE edema  Neuro: CX II-XII grossly in tact; ROM in all four extremities grossly in tact  Psych: alert and oriented x3; normal affect      LABORATORY/IMAGING DATA:  [facility]    ASSESSMENT/PLAN:    CAD, Ischemic Cardiomyopathy (EF 37%) s/p BiV ICD, HTN, HLD  VT s/p Ablation (8/7/24)  SBPs 90s-100s. HR 60s. Weight stable 272 lbs.   -- amiodarone  400 mg daily  -- lisinopril 5 mg daily -- add Hold SBP <90  -- metoprolol XL 12.5 mg daily -- change timing to bedtime  -- rosuvastatin 20 mg daily   -- follow up with [facility] cardiology as scheduled on 9/9/24 and 9/19/24    Chronic Atrial Fibrillation  HR 60s  -- metoprolol XL 12.5 mg daily -- change timing to bedtime  -- apixaban 5 mg BID    C2-C3 and C3-4 Spondylosis  Chronic Neck Pain   -- duloxetine 60 mg daily, gabapentin 300 mg TID, lido patch on q12h/off q12h, MS Contin 15 mg TID     Mild Major Recurrent Depression  Mood and spirits were quite good today.   -- duloxetine 60 mg daily   -- supportive cares     COPD  No signs of exacerbation  -- umeclidinium-vilanterol 62.5-25 mcg daily and albuterol MDI PRN    CKD, Stage III  Baseline Cr not known .   -- BMP with cardiology appointment on 9/9    BPH  -- finasteride 5 mg daily, tamsulosin 0.4 mg daily    Gout  By history. No signs of flare.   -- allopurinol 200 mg daily    Slow Transit Constipation  -- Miralax 17g daily and Senna-S 2 tabs BID   -- adjust bowel regimen as needed    Physical Deconditioning  In setting of hospitalization and underlying medical conditions  -- ongoing PT/OT      Electronically signed by:  Susana Grimm MD                          Sincerely,        Susana Grimm MD

## 2024-08-26 NOTE — PROGRESS NOTES
"[facility] GERIATRICS  INITIAL VISIT NOTE  August 26, 2024      PRIMARY CARE PROVIDER AND CLINIC:  Charlene Mott    [facility] Medical Record Number:  [id number]  Place of Service where encounter took place:  [facility] ([facility]) [[id number]]    Chief Complaint   Patient presents with    Nursing Home Acute       HPI:    Aneudy Bergeron is a 79 year old  (1945) male [facility]. Medical history is notable for CAD, ischemic cardiomyopathy (EF 37%) s/p BiV ICD, a-fib, HTN, COPD, cervical spondylosis, peripheral neuropathy. Limited records available for review from the [facility], but I believe was hospitalized at the [facility] from 8/7/24 to 8/13/24 for a VT ablation. TCU was recommended, but he wanted to try to go home where he realized he needed more help. He was again hospitalized at the [facility] from 8/16/24 to 8/17/24 for placement. He was admitted to this facility for  rehab, medical management, and nursing care.      History obtained from: facility chart records, facility staff, patient report, [facility] chart review, and [facility] .      Today, Mr. Bergeron is seen sitting in his power wheelchair. He was out and about on the facility grounds prior looking for the black squirrel. Notes his BPs are usually 110s. Reviewed his current BPs - no dizziness or lightheadedness. Says he doesn't feel a low BP unless it's \"like 80/40\" Discussed a parameter for the lisinopril to hold <90 systolic and he is OK with this. He takes his metoprolol at bedtime at home. No chest pain. No dyspnea. Feels much better after the ablation - no longer having shakes. He knows his renal function - was 1.6 when he last had it checked at the [facility], down from 1.9 and knew his Cr of 1.47 last week. [facility] and is aware of both upcoming cardiology appointments. No acute concerns today per discussion with nursing - talked about his BPs. " He is working with therapies. Has a care conference today at 3pm.     CODE STATUS: CPR/Full code     ALLERGIES:  Allergies   Allergen Reactions    Amlodipine     Pcn [Penicillins]     Nicotine Rash       PAST MEDICAL HISTORY:   Past Medical History:   Diagnosis Date    Exudative senile macular degeneration of retina (H)     HTN (hypertension)     Retinal neovascularization NOS     Senile nuclear sclerosis        PAST SURGICAL HISTORY:   Past Surgical History:   Procedure Laterality Date    EYLEA (AFLIBERCEPT) 2MG INTRAVITREAL INJECTION OD (RIGHT EYE)      x 2 RE    EYLEA (AFLIBERCEPT) 2MG INTRAVITREAL INJECTION OS (LEFT EYE)      x 6 LE       FAMILY HISTORY:   Family History   Problem Relation Age of Onset    Glaucoma No family hx of     Macular Degeneration No family hx of      SOCIAL HISTORY:   Patient's living condition: lives with spouse    MEDICATIONS:  Post Discharge Medication Reconciliation Status: discharge medications reconciled and changed, per note/orders.  Current Outpatient Medications   Medication Sig Dispense Refill    acetaminophen (TYLENOL) 500 MG tablet Take 1,000 mg by mouth every 8 hours as needed for mild pain      albuterol (PROAIR HFA/PROVENTIL HFA/VENTOLIN HFA) 108 (90 BASE) MCG/ACT Inhaler Inhale 2 puffs into the lungs 4 times daily as needed for shortness of breath      allopurinol (ZYLOPRIM) 100 MG tablet Take 200 mg by mouth daily      amiodarone (PACERONE) 200 MG tablet Take 400 mg by mouth daily      apixaban ANTICOAGULANT (ELIQUIS) 5 MG tablet Take 5 mg by mouth 2 times daily      DULoxetine (CYMBALTA) 60 MG capsule Take 60 mg by mouth daily      finasteride (PROSCAR) 5 MG tablet Take 5 mg by mouth daily      gabapentin (NEURONTIN) 300 MG capsule Take 300 mg by mouth 3 times daily      Lidocaine (LIDOCARE) 4 % Patch Place 1 patch onto the skin every 24 hours To prevent lidocaine toxicity, patient should be patch free for 12 hrs daily.      lisinopril (ZESTRIL) 5 MG tablet Take 5 mg by  mouth daily. Hold SBP <90      metoprolol (TOPROL-XL) 25 MG 24 hr tablet Take 0.5 tablets by mouth At Bedtime       morphine (MSIR) 15 MG tablet Take 15 mg by mouth 3 times daily      multivitamin  with lutein (OCUVITE WITH LTEIN) CAPS Take 1 capsule by mouth 2 times daily      naloxone (NARCAN) nasal spray Spray 4 mg into one nostril alternating nostrils as needed for opioid reversal every 2-3 minutes until assistance arrives      nicotine (NICORETTE) 4 MG lozenge Place 4 mg inside cheek every 4 hours as needed for nicotine withdrawal symptoms.      polyethylene glycol (MIRALAX) 17 GM/Dose powder Take 17 g by mouth daily      rosuvastatin (CRESTOR) 20 MG tablet Take 20 mg by mouth daily      salicylic acid (MEDIPLAST) 40 % miscellaneous Apply topically daily as needed.      senna-docusate (SENOKOT-S/PERICOLACE) 8.6-50 MG tablet Take 2 tablets by mouth 2 times daily      tamsulosin (FLOMAX) 0.4 MG capsule Take 0.4 mg by mouth At Bedtime      umeclidinium-vilanterol (ANORO ELLIPTA) 62.5-25 MCG/ACT oral inhaler Inhale 1 puff into the lungs daily         ROS:  6 point ROS neg other than the symptoms noted above in the HPI.    PHYSICAL EXAM:  BP 98/44   Pulse 58   Temp 97.8  F (36.6  C)   Resp 16   Ht 1.829 m (6')   Wt 123.4 kg (272 lb)   SpO2 93%   BMI 36.89 kg/m    Gen: sitting power wheelchair, alert, cooperative and in no acute distress  Resp: breathing non labored, no tachypnea   Ext: mild dependent bilateral LE edema  Neuro: CX II-XII grossly in tact; ROM in all four extremities grossly in tact  Psych: alert and oriented x3; normal affect      LABORATORY/IMAGING DATA:  Reviewed as per [facility] and/or [facility]    ASSESSMENT/PLAN:    CAD, Ischemic Cardiomyopathy (EF 37%) s/p BiV ICD, HTN, HLD  VT s/p Ablation (8/7/24)  SBPs 90s-100s. HR 60s. Weight stable 272 lbs.   -- amiodarone 400 mg daily  -- lisinopril 5 mg daily -- add Hold SBP <90  -- metoprolol XL 12.5 mg daily -- change timing to bedtime  --  rosuvastatin 20 mg daily   -- follow up with [facility] cardiology as scheduled on 9/9/24 and 9/19/24    Chronic Atrial Fibrillation  HR 60s  -- metoprolol XL 12.5 mg daily -- change timing to bedtime  -- apixaban 5 mg BID    C2-C3 and C3-4 Spondylosis  Chronic Neck Pain   -- duloxetine 60 mg daily, gabapentin 300 mg TID, lido patch on q12h/off q12h, MS Contin 15 mg TID     Mild Major Recurrent Depression  Mood and spirits were quite good today.   -- duloxetine 60 mg daily   -- supportive cares     COPD  No signs of exacerbation  -- umeclidinium-vilanterol 62.5-25 mcg daily and albuterol MDI PRN    CKD, Stage III  Baseline Cr not known .   -- BMP with cardiology appointment on 9/9    BPH  -- finasteride 5 mg daily, tamsulosin 0.4 mg daily    Gout  By history. No signs of flare.   -- allopurinol 200 mg daily    Slow Transit Constipation  -- Miralax 17g daily and Senna-S 2 tabs BID   -- adjust bowel regimen as needed    Physical Deconditioning  In setting of hospitalization and underlying medical conditions  -- ongoing PT/OT      Electronically signed by:  Susana Grimm MD

## 2024-09-03 DIAGNOSIS — G89.4 CHRONIC PAIN SYNDROME: Primary | ICD-10-CM

## 2024-09-03 RX ORDER — MORPHINE SULFATE 15 MG/1
15 TABLET ORAL 3 TIMES DAILY
Qty: 60 TABLET | Refills: 0 | Status: SHIPPED | OUTPATIENT
Start: 2024-09-03

## 2024-09-06 ENCOUNTER — TRANSITIONAL CARE UNIT VISIT (OUTPATIENT)
Dept: GERIATRICS | Facility: CLINIC | Age: 79
End: 2024-09-06
Payer: COMMERCIAL

## 2024-09-06 VITALS
DIASTOLIC BLOOD PRESSURE: 53 MMHG | WEIGHT: 276.5 LBS | TEMPERATURE: 97.7 F | OXYGEN SATURATION: 96 % | HEART RATE: 62 BPM | BODY MASS INDEX: 37.5 KG/M2 | SYSTOLIC BLOOD PRESSURE: 125 MMHG | RESPIRATION RATE: 18 BRPM

## 2024-09-06 DIAGNOSIS — I25.10 CORONARY ARTERY DISEASE INVOLVING NATIVE CORONARY ARTERY OF NATIVE HEART WITHOUT ANGINA PECTORIS: ICD-10-CM

## 2024-09-06 DIAGNOSIS — I50.22 CHRONIC SYSTOLIC CONGESTIVE HEART FAILURE (H): ICD-10-CM

## 2024-09-06 DIAGNOSIS — J44.9 CHRONIC OBSTRUCTIVE PULMONARY DISEASE, UNSPECIFIED COPD TYPE (H): ICD-10-CM

## 2024-09-06 DIAGNOSIS — R53.81 PHYSICAL DECONDITIONING: Primary | ICD-10-CM

## 2024-09-06 PROCEDURE — 99308 SBSQ NF CARE LOW MDM 20: CPT | Performed by: NURSE PRACTITIONER

## 2024-09-06 NOTE — PROGRESS NOTES
[facility] GERIATRICS    Chief Complaint   Patient presents with    RECHECK     HPI:  Aneudy Bergeron is a 79 year old  (1945), who is being seen today for an episodic care visit at: [facility] (TCU) [[id number]]. Patient admitted to the above facility from  [facility] . Hospital stay 8/16/24 through 8/17/24. Limited information available due [facility]. PMH includes BiV ICD, afib, CAD, HTN, CHF, COPD, CKD, chronic pain due to cervical spondylosis, and gout. He was recently discharged from the cardiology service after an ablation for V-tach (8/7/24). His ICD had fired twice and he was not aware. He also had a CHF exacerbation that stay, EF 37%. He wanted to trial going home, but this did not go well due to weakness, so [facility].      Today's concern is:   Patient is seen briefly, moving about the hallway in his electric chair. He says he is doing well. Per therapy, he is managing 16 stairs and will discharge home early next week.     Allergies, and PMH/PSH reviewed in [facility] today.  REVIEW OF SYSTEMS:  4 point ROS including Respiratory, CV, GI and , other than that noted in the HPI,  is negative    Objective:   /53   Pulse 62   Temp 97.7  F (36.5  C)   Resp 18   Wt 125.4 kg (276 lb 8 oz)   SpO2 96%   BMI 37.50 kg/m    GENERAL APPEARANCE:  Alert, in no distress  RESP:  no respiratory distress  PSYCH:  oriented X 3    Recent labs in [facility] reviewed by me today.     Assessment/Plan:  (R53.81) Physical deconditioning  (primary encounter diagnosis)  Comment: Improving  Plan: PT/OT eval and treat, discharge planning per their recommendations.    (I25.10) Coronary artery disease involving native coronary artery of native heart without angina pectoris  (I50.22) Chronic ischemic systolic congestive heart failure (H) EF 40-45%  (J44.9) Chronic obstructive pulmonary disease, unspecified COPD type (H)  Comment: Asymptomatic.  Stable  Plan: Continue current POC with no changes at this time and adjustments as needed.        Electronically signed by: LETY Dickerson CNP

## 2024-09-06 NOTE — LETTER
9/6/2024      Aneudy Bergeron  [address] [id number]        [facility] GERIATRICS    Chief Complaint   Patient presents with     RECHECK     HPI:  Aneudy Bergeron is a 79 year old  (1945), who is being seen today for an episodic care visit at: [facility] (TCU) [[id number]]. Patient admitted to the above facility from  [facility] . Hospital stay 8/16/24 through 8/17/24. Limited information available due [facility]. PMH includes BiV ICD, afib, CAD, HTN, CHF, COPD, CKD, chronic pain due to cervical spondylosis, and gout. He was recently discharged from the cardiology service after an ablation for V-tach (8/7/24). His ICD had fired twice and he was not aware. He also had a CHF exacerbation that stay, EF 37%. He wanted to trial going home, but this did not go well due to weakness, so [facility].      Today's concern is:   Patient is seen briefly, moving about the hallway in his electric chair. He says he is doing well. Per therapy, he is managing 16 stairs and will discharge home early next week.     Allergies, and PMH/PSH reviewed in [facility] today.  REVIEW OF SYSTEMS:  4 point ROS including Respiratory, CV, GI and , other than that noted in the HPI,  is negative    Objective:   /53   Pulse 62   Temp 97.7  F (36.5  C)   Resp 18   Wt 125.4 kg (276 lb 8 oz)   SpO2 96%   BMI 37.50 kg/m    GENERAL APPEARANCE:  Alert, in no distress  RESP:  no respiratory distress  PSYCH:  oriented X 3    Recent labs in [facility] reviewed by me today.     Assessment/Plan:  (R53.81) Physical deconditioning  (primary encounter diagnosis)  Comment: Improving  Plan: PT/OT eval and treat, discharge planning per their recommendations.    (I25.10) Coronary artery disease involving native coronary artery of native heart without angina pectoris  (I50.22) Chronic ischemic systolic congestive heart failure (H) EF 40-45%  (J44.9) Chronic  obstructive pulmonary disease, unspecified COPD type (H)  Comment: Asymptomatic. Stable  Plan: Continue current POC with no changes at this time and adjustments as needed.        Electronically signed by: LETY Dickerson CNP           Sincerely,        LETY Dickerson CNP

## 2024-09-09 ENCOUNTER — DISCHARGE SUMMARY NURSING HOME (OUTPATIENT)
Dept: GERIATRICS | Facility: CLINIC | Age: 79
End: 2024-09-09
Payer: COMMERCIAL

## 2024-09-09 VITALS
BODY MASS INDEX: 37.98 KG/M2 | HEART RATE: 68 BPM | WEIGHT: 280.4 LBS | OXYGEN SATURATION: 94 % | TEMPERATURE: 97.3 F | DIASTOLIC BLOOD PRESSURE: 53 MMHG | RESPIRATION RATE: 18 BRPM | SYSTOLIC BLOOD PRESSURE: 100 MMHG | HEIGHT: 72 IN

## 2024-09-09 DIAGNOSIS — R53.81 PHYSICAL DECONDITIONING: Primary | ICD-10-CM

## 2024-09-09 DIAGNOSIS — I50.22 CHRONIC SYSTOLIC CONGESTIVE HEART FAILURE (H): ICD-10-CM

## 2024-09-09 DIAGNOSIS — M47.12 CERVICAL SPONDYLOSIS WITH MYELOPATHY: ICD-10-CM

## 2024-09-09 DIAGNOSIS — J44.9 CHRONIC OBSTRUCTIVE PULMONARY DISEASE, UNSPECIFIED COPD TYPE (H): ICD-10-CM

## 2024-09-09 DIAGNOSIS — I25.10 CORONARY ARTERY DISEASE INVOLVING NATIVE CORONARY ARTERY OF NATIVE HEART WITHOUT ANGINA PECTORIS: ICD-10-CM

## 2024-09-09 PROCEDURE — 99315 NF DSCHRG MGMT 30 MIN/LESS: CPT | Performed by: NURSE PRACTITIONER

## 2024-09-09 NOTE — LETTER
9/9/2024      Aneudy Bergeron  [address] [id number]        [facility] GERIATRICS DISCHARGE SUMMARY  PATIENT'S NAME: Aneudy Bergeron  YOB: 1945  MEDICAL RECORD NUMBER:  [id number]  Place of Service where encounter took place:  [facility] (TCU) [[id number]]    PRIMARY CARE PROVIDER AND CLINIC RESPONSIBLE AFTER TRANSFER:   Charlene Mott MD, None    Non-FMG Provider     Transferring providers: LETY Dickerson CNP, Susana Grimm MD  Recent Hospitalization/ED:  [facility]  stay 8/16/24 to 8/17/24.  Date of SNF Admission: August 17, 2024  Date of SNF (anticipated) Discharge: September 10, 2024  Discharged to: previous independent home  Physical Function:  managing 16 stairs    CODE STATUS/ADVANCE DIRECTIVES DISCUSSION:  Full Code   ALLERGIES: Amlodipine, Pcn [penicillins], and Nicotine    NURSING FACILITY COURSE   Medication Changes/Rationale:   none    Summary of nursing facility stay:   Aneudy Bergeron  is a 79 year old  (1945), admitted to the above facility from  [facility] . Hospital stay 8/16/24 through 8/17/24. Limited information available due [facility]. PMH includes BiV ICD, afib, CAD, HTN, CHF, COPD, CKD, chronic pain due to cervical spondylosis, and gout. He was recently discharged from the cardiology service after an ablation for V-tach (8/7/24). His ICD had fired twice and he was not aware. He also had a CHF exacerbation that stay, EF 37%. He wanted to trial going home, but this did not go well due to weakness, so [facility].      Physical deconditioning  Patient and his wife live in a split level home. He is now able to manage the stairs he needs to to be safe at home. He uses an electric chair for most mobility. He and his wife are planning to move sometime soon to a home on all one level.     Coronary artery disease involving native coronary artery of  native heart without angina pectoris  Chronic ischemic systolic congestive heart failure (H) EF 40-45%  Chronic obstructive pulmonary disease, unspecified COPD type (H)  All were stable throughout TCU stay.     Cervical spondylosis with myelopathy  Chronic pain controlled with morphine.       Discharge Medications:  Current Outpatient Medications   Medication Sig Dispense Refill     acetaminophen (TYLENOL) 500 MG tablet Take 1,000 mg by mouth every 8 hours as needed for mild pain       albuterol (PROAIR HFA/PROVENTIL HFA/VENTOLIN HFA) 108 (90 BASE) MCG/ACT Inhaler Inhale 2 puffs into the lungs 4 times daily as needed for shortness of breath       allopurinol (ZYLOPRIM) 100 MG tablet Take 200 mg by mouth daily       amiodarone (PACERONE) 200 MG tablet Take 400 mg by mouth daily       apixaban ANTICOAGULANT (ELIQUIS) 5 MG tablet Take 5 mg by mouth 2 times daily       DULoxetine (CYMBALTA) 60 MG capsule Take 60 mg by mouth daily       finasteride (PROSCAR) 5 MG tablet Take 5 mg by mouth daily       gabapentin (NEURONTIN) 300 MG capsule Take 300 mg by mouth 3 times daily       Lidocaine (LIDOCARE) 4 % Patch Place 1 patch onto the skin every 24 hours To prevent lidocaine toxicity, patient should be patch free for 12 hrs daily.       lisinopril (ZESTRIL) 5 MG tablet Take 5 mg by mouth daily. Hold SBP <90       metoprolol (TOPROL-XL) 25 MG 24 hr tablet Take 0.5 tablets by mouth At Bedtime        morphine (MSIR) 15 MG IR tablet Take 1 tablet (15 mg) by mouth 3 times daily. 60 tablet 0     multivitamin  with lutein (OCUVITE WITH LTEIN) CAPS Take 1 capsule by mouth 2 times daily       naloxone (NARCAN) nasal spray Spray 4 mg into one nostril alternating nostrils as needed for opioid reversal every 2-3 minutes until assistance arrives       nicotine (NICORETTE) 4 MG lozenge Place 4 mg inside cheek every 4 hours as needed for nicotine withdrawal symptoms.       polyethylene glycol (MIRALAX) 17 GM/Dose powder Take 17 g by mouth  daily       rosuvastatin (CRESTOR) 20 MG tablet Take 20 mg by mouth daily       salicylic acid (MEDIPLAST) 40 % miscellaneous Apply topically daily as needed.       senna-docusate (SENOKOT-S/PERICOLACE) 8.6-50 MG tablet Take 2 tablets by mouth 2 times daily       tamsulosin (FLOMAX) 0.4 MG capsule Take 0.4 mg by mouth At Bedtime       umeclidinium-vilanterol (ANORO ELLIPTA) 62.5-25 MCG/ACT oral inhaler Inhale 1 puff into the lungs daily          Controlled medications:   OK to send remaining Morphine     Past Medical History:   Past Medical History:   Diagnosis Date     Exudative senile macular degeneration of retina (H)      HTN (hypertension)      Retinal neovascularization NOS      Senile nuclear sclerosis      Physical Exam:   Vitals: /53   Pulse 68   Temp 97.3  F (36.3  C)   Resp 18   Ht 1.829 m (6')   Wt 127.2 kg (280 lb 6.4 oz)   SpO2 94%   BMI 38.03 kg/m    BMI: Body mass index is 38.03 kg/m .  GENERAL APPEARANCE:  Alert, in no distress  RESP:  no respiratory distress  PSYCH:  oriented X 3, affect and mood normal     SNF labs: Labs done in SNF are in [facility] [facility]. Please refer to them using [facility] Everywhere.    DISCHARGE PLAN:  Follow up labs: No labs orders/due  Medical Follow Up:      Follow up with primary care provider in 1-2 weeks  Discharge Services: Home Care:  Occupational Therapy, Physical Therapy, Registered Nurse, and Home Health Aide      TOTAL DISCHARGE TIME:   Less than or equal to 30 minutes  Electronically signed by:  LETY Dickerson CNP       Documentation of Face to Face and Certification for Home Health Services    I certify that services are/were furnished while this patient was under the care of a physician and that a physician or an allowed non-physician practitioner (NPP), had a face-to-face encounter that meets the physician face-to-face encounter requirements. The encounter was in whole, or in part, related to the primary reason for home health. The patient is  confined to his/her home and needs intermittent skilled nursing, physical therapy, speech-language pathology, or the continued need for occupational therapy. A plan of care has been established by a physician and is periodically reviewed by a physician.  Date of Face-to-Face Encounter: 9/9/2024.    I certify that, based on my findings, the following services are medically necessary home health services: Nursing, Occupational Therapy, and Physical Therapy.    My clinical findings support the need for the above skilled services because: Leaving home is medically contraindicated for the following reason(s): managing stairs is very taxing, high risk for falls if attempted multiple times per day..    Patient to re-establish plan of care with their PCP within 7-10 days after leaving the facility to reestablish care.  Medicare certified PECOS provider: LETY Dickerson CNP  Date: September 9, 2024                  Sincerely,        LETY Dickerson CNP

## 2024-09-09 NOTE — PROGRESS NOTES
[facility] GERIATRICS DISCHARGE SUMMARY  PATIENT'S NAME: Aneudy Bergeron  YOB: 1945  MEDICAL RECORD NUMBER:  [id number]  Place of Service where encounter took place:  [facility] (TCU) [[id number]]    PRIMARY CARE PROVIDER AND CLINIC RESPONSIBLE AFTER TRANSFER:   Charlene Mott MD, None    Non-FMG Provider     Transferring providers: Regina Escobedo, LETY COCHRAN, Susana Grimm MD  Recent Hospitalization/ED:  [facility]  stay 8/16/24 to 8/17/24.  Date of SNF Admission: August 17, 2024  Date of SNF (anticipated) Discharge: September 10, 2024  Discharged to: previous independent home  Physical Function:  managing 16 stairs    CODE STATUS/ADVANCE DIRECTIVES DISCUSSION:  Full Code   ALLERGIES: Amlodipine, Pcn [penicillins], and Nicotine    NURSING FACILITY COURSE   Medication Changes/Rationale:   none    Summary of nursing facility stay:   Aneudy Bergeron  is a 79 year old  (1945), [facility] Hospital stay 8/16/24 through 8/17/24. Limited information available due [facility]. PMH includes BiV ICD, afib, CAD, HTN, CHF, COPD, CKD, chronic pain due to cervical spondylosis, and gout. He was recently discharged from the cardiology service after an ablation for V-tach (8/7/24). His ICD had fired twice and he was not aware. He also had a CHF exacerbation that stay, EF 37%. He wanted to trial going home, but this did not go well due to weakness, so [facility].      Physical deconditioning  Patient and his wife live in a split level home. He is now able to manage the stairs he needs to to be safe at home. He uses an electric chair for most mobility. He and his wife are planning to move sometime soon to a home on all one level.     Coronary artery disease involving native coronary artery of native heart without angina pectoris  Chronic ischemic systolic congestive heart  failure (H) EF 40-45%  Chronic obstructive pulmonary disease, unspecified COPD type (H)  All were stable throughout TCU stay.     Cervical spondylosis with myelopathy  Chronic pain controlled with morphine.       Discharge Medications:  Current Outpatient Medications   Medication Sig Dispense Refill    acetaminophen (TYLENOL) 500 MG tablet Take 1,000 mg by mouth every 8 hours as needed for mild pain      albuterol (PROAIR HFA/PROVENTIL HFA/VENTOLIN HFA) 108 (90 BASE) MCG/ACT Inhaler Inhale 2 puffs into the lungs 4 times daily as needed for shortness of breath      allopurinol (ZYLOPRIM) 100 MG tablet Take 200 mg by mouth daily      amiodarone (PACERONE) 200 MG tablet Take 400 mg by mouth daily      apixaban ANTICOAGULANT (ELIQUIS) 5 MG tablet Take 5 mg by mouth 2 times daily      DULoxetine (CYMBALTA) 60 MG capsule Take 60 mg by mouth daily      finasteride (PROSCAR) 5 MG tablet Take 5 mg by mouth daily      gabapentin (NEURONTIN) 300 MG capsule Take 300 mg by mouth 3 times daily      Lidocaine (LIDOCARE) 4 % Patch Place 1 patch onto the skin every 24 hours To prevent lidocaine toxicity, patient should be patch free for 12 hrs daily.      lisinopril (ZESTRIL) 5 MG tablet Take 5 mg by mouth daily. Hold SBP <90      metoprolol (TOPROL-XL) 25 MG 24 hr tablet Take 0.5 tablets by mouth At Bedtime       morphine (MSIR) 15 MG IR tablet Take 1 tablet (15 mg) by mouth 3 times daily. 60 tablet 0    multivitamin  with lutein (OCUVITE WITH LTEIN) CAPS Take 1 capsule by mouth 2 times daily      naloxone (NARCAN) nasal spray Spray 4 mg into one nostril alternating nostrils as needed for opioid reversal every 2-3 minutes until assistance arrives      nicotine (NICORETTE) 4 MG lozenge Place 4 mg inside cheek every 4 hours as needed for nicotine withdrawal symptoms.      polyethylene glycol (MIRALAX) 17 GM/Dose powder Take 17 g by mouth daily      rosuvastatin (CRESTOR) 20 MG tablet Take 20 mg by mouth daily      salicylic acid  (MEDIPLAST) 40 % miscellaneous Apply topically daily as needed.      senna-docusate (SENOKOT-S/PERICOLACE) 8.6-50 MG tablet Take 2 tablets by mouth 2 times daily      tamsulosin (FLOMAX) 0.4 MG capsule Take 0.4 mg by mouth At Bedtime      umeclidinium-vilanterol (ANORO ELLIPTA) 62.5-25 MCG/ACT oral inhaler Inhale 1 puff into the lungs daily          Controlled medications:   OK to send remaining Morphine     Past Medical History:   Past Medical History:   Diagnosis Date    Exudative senile macular degeneration of retina (H)     HTN (hypertension)     Retinal neovascularization NOS     Senile nuclear sclerosis      Physical Exam:   Vitals: /53   Pulse 68   Temp 97.3  F (36.3  C)   Resp 18   Ht 1.829 m (6')   Wt 127.2 kg (280 lb 6.4 oz)   SpO2 94%   BMI 38.03 kg/m    BMI: Body mass index is 38.03 kg/m .  GENERAL APPEARANCE:  Alert, in no distress  RESP:  no respiratory distress  PSYCH:  oriented X 3, affect and mood normal     SNF labs: Labs done in SNF are in [place] [facility]. Please refer to them using [facility] Everywhere.    DISCHARGE PLAN:  Follow up labs: No labs orders/due  Medical Follow Up:      Follow up with primary care provider in 1-2 weeks  Discharge Services: Home Care:  Occupational Therapy, Physical Therapy, Registered Nurse, and Home Health Aide      TOTAL DISCHARGE TIME:   Less than or equal to 30 minutes  Electronically signed by:  LETY Dickerson CNP       Documentation of Face to Face and Certification for Home Health Services    I certify that services are/were furnished while this patient was under the care of a physician and that a physician or an allowed non-physician practitioner (NPP), had a face-to-face encounter that meets the physician face-to-face encounter requirements. The encounter was in whole, or in part, related to the primary reason for home health. The patient is confined to his/her home and needs intermittent skilled nursing, physical therapy, speech-language  pathology, or the continued need for occupational therapy. A plan of care has been established by a physician and is periodically reviewed by a physician.  Date of Face-to-Face Encounter: 9/9/2024.    I certify that, based on my findings, the following services are medically necessary home health services: Nursing, Occupational Therapy, and Physical Therapy.    My clinical findings support the need for the above skilled services because: Leaving home is medically contraindicated for the following reason(s): managing stairs is very taxing, high risk for falls if attempted multiple times per day..    Patient to re-establish plan of care with their PCP within 7-10 days after leaving the facility to reestablish care.  Medicare certified [facility] provider: LETY Dickerson CNP  Date: September 9, 2024

## 2024-09-25 PROBLEM — I25.5 GENERALIZED ISCHEMIC MYOCARDIAL DYSFUNCTION: Status: ACTIVE | Noted: 2020-11-25

## 2024-09-25 PROBLEM — G62.9 NEUROPATHY, PERIPHERAL: Status: ACTIVE | Noted: 2020-11-25

## 2024-09-25 PROBLEM — M48.062 SPINAL STENOSIS, LUMBAR REGION WITH NEUROGENIC CLAUDICATION: Status: ACTIVE | Noted: 2021-01-04

## 2024-09-25 PROBLEM — E55.9 VITAMIN D DEFICIENCY: Status: ACTIVE | Noted: 2024-09-25

## 2024-09-25 PROBLEM — Z73.6 LIMITATION OF ACTIVITIES DUE TO DISABILITY: Status: ACTIVE | Noted: 2024-09-25

## 2024-09-25 PROBLEM — S80.811A ABRASION, RIGHT LOWER LEG, INITIAL ENCOUNTER: Status: ACTIVE | Noted: 2024-09-25

## 2024-09-25 PROBLEM — Z65.8 OTHER SPECIFIED PROBLEMS RELATED TO PSYCHOSOCIAL CIRCUMSTANCES: Status: ACTIVE | Noted: 2024-09-25

## 2024-09-25 PROBLEM — G60.9 IDIOPATHIC PERIPHERAL NEUROPATHY: Status: ACTIVE | Noted: 2024-09-25

## 2024-09-25 PROBLEM — K08.131: Status: ACTIVE | Noted: 2024-09-25

## 2024-09-25 PROBLEM — F11.20 OPIOID DEPENDENCE (H): Status: ACTIVE | Noted: 2024-09-25

## 2024-09-25 PROBLEM — I50.9 CONGESTIVE CARDIAC FAILURE (H): Status: ACTIVE | Noted: 2024-09-25

## 2024-09-25 PROBLEM — R57.8: Status: ACTIVE | Noted: 2024-09-25

## 2024-09-25 PROBLEM — H34.239 BRANCH RETINAL ARTERY OCCLUSION: Status: ACTIVE | Noted: 2021-05-11

## 2024-09-25 PROBLEM — Z95.810 PRESENCE OF AUTOMATIC (IMPLANTABLE) CARDIAC DEFIBRILLATOR: Status: ACTIVE | Noted: 2020-11-25

## 2024-09-25 PROBLEM — Z79.01 LONG TERM CURRENT USE OF ANTICOAGULANT THERAPY: Status: ACTIVE | Noted: 2024-09-25

## 2024-09-25 PROBLEM — Z95.810 CARDIAC DEFIBRILLATOR IN SITU: Status: ACTIVE | Noted: 2024-09-25

## 2024-09-25 PROBLEM — Z77.29 EXPOSURE TO POTENTIALLY HAZARDOUS SUBSTANCE: Status: ACTIVE | Noted: 2024-03-08

## 2024-09-25 PROBLEM — R62.7 ADULT FAILURE TO THRIVE: Status: ACTIVE | Noted: 2024-09-25

## 2024-09-25 PROBLEM — Z74.09 OTHER REDUCED MOBILITY: Status: ACTIVE | Noted: 2024-09-25

## 2024-09-25 PROBLEM — S00.93XA: Status: ACTIVE | Noted: 2024-09-25

## 2024-09-25 PROBLEM — G60.9 HEREDITARY AND IDIOPATHIC NEUROPATHY, UNSPECIFIED: Status: ACTIVE | Noted: 2024-09-25

## 2024-09-25 PROBLEM — K08.199: Status: ACTIVE | Noted: 2024-09-25

## 2024-09-25 PROBLEM — I47.20 VENTRICULAR TACHYCARDIA, UNSPECIFIED (H): Status: ACTIVE | Noted: 2024-09-25

## 2024-09-25 PROBLEM — J18.9 PNEUMONIA, UNSPECIFIED ORGANISM: Status: ACTIVE | Noted: 2024-09-25

## 2024-09-25 PROBLEM — Z95.810 BIVENTRICULAR AUTOMATIC IMPLANTABLE CARDIOVERTER DEFIBRILLATOR IN SITU: Status: ACTIVE | Noted: 2024-09-25

## 2024-09-25 PROBLEM — E78.5 HYPERLIPIDEMIA: Status: ACTIVE | Noted: 2020-11-25

## 2024-09-25 PROBLEM — R06.02 SHORTNESS OF BREATH: Status: ACTIVE | Noted: 2024-09-25

## 2024-09-25 PROBLEM — F11.20 OPIOID DEPENDENCE, UNCOMPLICATED (H): Status: ACTIVE | Noted: 2024-09-25

## 2024-09-25 PROBLEM — N17.9 ACUTE RENAL FAILURE (H): Status: ACTIVE | Noted: 2022-08-31

## 2024-09-25 PROBLEM — Z79.891 LONG TERM (CURRENT) USE OF OPIATE ANALGESIC: Status: ACTIVE | Noted: 2020-11-25

## 2024-09-25 PROBLEM — F17.210 TOBACCO DEPENDENCE DUE TO CIGARETTES: Status: ACTIVE | Noted: 2020-11-25
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
## 2024-09-25 PROBLEM — D64.9 ANEMIA: Status: ACTIVE | Noted: 2024-09-25

## 2024-09-25 PROBLEM — A41.9 SEPSIS (H): Status: ACTIVE | Noted: 2022-08-30
